# Patient Record
Sex: FEMALE | Race: WHITE | NOT HISPANIC OR LATINO | Employment: FULL TIME | URBAN - METROPOLITAN AREA
[De-identification: names, ages, dates, MRNs, and addresses within clinical notes are randomized per-mention and may not be internally consistent; named-entity substitution may affect disease eponyms.]

---

## 2020-01-28 ENCOUNTER — CLINICAL SUPPORT (OUTPATIENT)
Dept: NUTRITION | Facility: HOSPITAL | Age: 25
End: 2020-01-28
Payer: COMMERCIAL

## 2020-01-28 VITALS — WEIGHT: 116 LBS | HEIGHT: 66 IN | BODY MASS INDEX: 18.64 KG/M2

## 2020-01-28 DIAGNOSIS — Z78.9 VEGAN: ICD-10-CM

## 2020-01-28 NOTE — PROGRESS NOTES
Initial Nutrition Assessment Form    Patient Name: Ramana Springer    YOB: 1995    Sex: Female     Assessment Date: 1/28/2020  Start Time: 9:35 Stop Time: 10:15 Total Minutes: 45min     Data:  Present at session: self   Parent/Patient Concerns: Increased fatigue    Medical Dx/Reason for Referral: Vegan Z78 9   No past medical history on file  No current outpatient medications on file  No current facility-administered medications for this visit  Hemachomatosis- elevated iron levels   Additional Meds/Supplements: Vit B12, Vit D   Special Learning Needs: none   Height: 5'6"   Weight: Wt Readings from Last 10 Encounters:   01/28/20 52 6 kg (116 lb)     Estimated body mass index is 18 72 kg/m² as calculated from the following:    Height as of this encounter: 5' 6" (1 676 m)  Weight as of this encounter: 52 6 kg (116 lb)  Recent Weight Change: []Yes     [x]No  Amount:       Energy Needs: [address] Equation:  1700   Allergies not on file    Social History     Substance and Sexual Activity   Alcohol Use Not on file       Social History     Tobacco Use   Smoking Status Not on file       Who shops? patient   Who cooks? patient   Exercise: Walks her dog, goes on hikes, wants to life weights to gain muscle   Prior Counseling? []Yes     [x]No  When: none     Why: none        Diet Hx:  Breakfast: oatmeal packet, eggs, fruit, acai bowls, chobani yogurt drink, edmund bars 930-10 a m  Lunch: started incorporating turkey sandwiches back into diet  Salads, rice and beans, pasta and veggies, avocado, smoothies 3 p m  Dinner: [employer] products, same items as lunch Amandeep food 7-9 p m  Snacks: grazes throughout the day on Protein bars         Nutrition Diagnosis:   Inadequate energy intake  related to estimated caloric needs > estimated caloric intake as evidenced by  24hr recall, plant-based diet off and on over the past 10 years, chronic fatigue          Medical Nutrition Therapy Intervention:  []Individualized Meal Plan []Understanding Lab Values   []Basic Pathophysiology of Disease []Food/Medication Interactions   []Food Diary [x]Exercise   [x]Lifestyle/Behavior Modification Techniques []Medication, Mechanism of Action   []Label Reading []Self Blood Glucose Monitoring   []Weight/BMI Goals []Other -    Other Notes: Rima Gee presents today to discuss her current eating habits in relation to her fatigue  She has been vegetarian on and off for the past 10 years  Bloodwork was performed by her PCP  She has hemachomatosis, therefore she has high iron levels, the rest of her bloodwork (scanned into media section) shows normal B vitamin levels, as well as normal BUN, Vit D was low, normal thyroid function  After review of 24 hr recall, pt is not eating enough calories throughout the day as she eats small portions, and she has long gaps in between her meals, around 6h, which may be contributing to her fatigue  Also focused on protein, Viut B12, Vit D, calcium and zinc for optimal plant-based health  Also discussed her intake of Morning Star products and recommended switching to other whole-food tofu/soy products for plant based protein  Handouts provided on Vit D, calcium, protein, zinc       Comprehension: []Excellent  []Very Good  [x]Good  []Fair   []Poor    Receptivity: []Excellent  []Very Good  [x]Good  []Fair   []Poor    Expected Compliance: []Excellent  []Very Good  [x]Good  []Fair   []Poor        Goals:  1  Increase calories throughout the day to prevent feelings of fatigue   2  Adjust timing of meals to reduce long time periods between meals   3  Increase whole food plant-based protein items        No follow-ups on file    Labs:  CMP  No results found for: NA, K, CL, CO2, ANIONGAP, BUN, CREATININE, GLUCOSE, GLUF, CALCIUM, CORRECTEDCA, AST, ALT, ALKPHOS, PROT, BILITOT, EGFR    BMP  No results found for: GLUCOSE, CALCIUM, NA, K, CO2, CL, BUN, CREATININE    Lipids  No results found for: CHOL  No results found for: HDL  No results found for: LDLCALC  No results found for: TRIG  No results found for: CHOLHDL    Hemoglobin A1C  No results found for: HGBA1C    Fasting Glucose  No results found for: GLUF    Insulin     Thyroid  No results found for: TSH, R2BZYZZ, N4PNWUE, THYROIDAB    Hepatic Function Panel  No results found for: ALT, AST, GGT, ALKPHOS, BILITOT    Celiac Disease Antibody Panel  No results found for: ENDOMYSIAL IGA, GLIADIN IGA, GLIADIN IGG, IGA, TISSUE TRANSGLUT AB, TTG IGA   Iron  No results found for: IRON, TIBC, FERRITIN    Vitamins  No results found for: VITAMIN B2   No results found for: NICOTINAMIDE, NICOTINIC ACID   No results found for: VITAMINB6  No results found for: XXHODCFY51  No results found for: VITB5  No results found for: R2DDMVSX  No results found for: THYROGLB  No results found for: VITAMIN K   No results found for: 25-HYDROXY VIT D   No components found for: Zuleyma Oconnor MS, RD, LDN  [phone]  [email or web address]  Sr[address] Mun[address]  [address] [address]

## 2020-01-28 NOTE — PROGRESS NOTES
Pt visited for nutrition education on plant-based diet  Pt's insurance will not cover nutrition appointment  Out of pocket charge of $80 to be billed to patient  Kvng Willoughby MS, RD, [address] [address]  [email or web address]

## 2021-08-10 ENCOUNTER — HOSPITAL ENCOUNTER (OUTPATIENT)
Dept: ULTRASOUND IMAGING | Facility: CLINIC | Age: 26
Discharge: HOME | End: 2021-08-10
Payer: COMMERCIAL

## 2021-08-10 DIAGNOSIS — R10.11 RIGHT UPPER QUADRANT PAIN: ICD-10-CM

## 2021-08-10 PROCEDURE — 76705 ECHO EXAM OF ABDOMEN: CPT

## 2021-12-09 ENCOUNTER — NURSE TRIAGE (OUTPATIENT)
Dept: OTHER | Facility: OTHER | Age: 26
End: 2021-12-09

## 2021-12-09 DIAGNOSIS — Z11.59 SPECIAL SCREENING EXAMINATION FOR VIRAL DISEASE: Primary | ICD-10-CM

## 2021-12-09 PROCEDURE — U0005 INFEC AGEN DETEC AMPLI PROBE: HCPCS | Performed by: FAMILY MEDICINE

## 2021-12-09 PROCEDURE — U0003 INFECTIOUS AGENT DETECTION BY NUCLEIC ACID (DNA OR RNA); SEVERE ACUTE RESPIRATORY SYNDROME CORONAVIRUS 2 (SARS-COV-2) (CORONAVIRUS DISEASE [COVID-19]), AMPLIFIED PROBE TECHNIQUE, MAKING USE OF HIGH THROUGHPUT TECHNOLOGIES AS DESCRIBED BY CMS-2020-01-R: HCPCS | Performed by: FAMILY MEDICINE

## 2022-03-17 ENCOUNTER — OFFICE VISIT (OUTPATIENT)
Dept: OBGYN CLINIC | Facility: CLINIC | Age: 27
End: 2022-03-17
Payer: COMMERCIAL

## 2022-03-17 VITALS
WEIGHT: 129 LBS | SYSTOLIC BLOOD PRESSURE: 116 MMHG | HEIGHT: 65 IN | DIASTOLIC BLOOD PRESSURE: 78 MMHG | BODY MASS INDEX: 21.49 KG/M2

## 2022-03-17 DIAGNOSIS — N94.3 PMS (PREMENSTRUAL SYNDROME): ICD-10-CM

## 2022-03-17 DIAGNOSIS — Z01.419 ENCOUNTER FOR GYNECOLOGICAL EXAMINATION (GENERAL) (ROUTINE) WITHOUT ABNORMAL FINDINGS: Primary | ICD-10-CM

## 2022-03-17 PROCEDURE — G0145 SCR C/V CYTO,THINLAYER,RESCR: HCPCS | Performed by: OBSTETRICS & GYNECOLOGY

## 2022-03-17 PROCEDURE — S0610 ANNUAL GYNECOLOGICAL EXAMINA: HCPCS | Performed by: OBSTETRICS & GYNECOLOGY

## 2022-03-17 RX ORDER — UBIDECARENONE 75 MG
1 CAPSULE ORAL EVERY MORNING
COMMUNITY

## 2022-03-17 RX ORDER — FLUOXETINE 10 MG/1
10 CAPSULE ORAL DAILY
Qty: 30 CAPSULE | Refills: 2 | Status: SHIPPED | OUTPATIENT
Start: 2022-03-17 | End: 2022-06-21

## 2022-03-17 RX ORDER — PNV NO.95/FERROUS FUM/FOLIC AC 28MG-0.8MG
2 TABLET ORAL
COMMUNITY

## 2022-03-17 NOTE — PROGRESS NOTES
Stan Maryan  1995      CC:  Yearly exam - New Patient Annual    S:  27 y o  female here for yearly exam      Cycles reviewed:  11/11, 12/9, 1/3, 1/28, 2/26  They are not heavy other than first day  Mild cramping  Does have PMS symptoms (mood changes pre-cycle)  Did have breast lumpectomy - no concerns since then  She denies concerning vaginal discharge, itching, pelvic pain  She has does not urinary concerns, does not have incontinence  No bowel concerns  No breast concerns  Sexual activity: She is sexually active without pain, bleeding or dryness  She is  and monogamous  She is not interested in STD screening today  Works as therapist - mostly teens/adolescents/eating disorders in [facility]  Contraception: She is not using contraception  Open to pregnancy if occurs  Last Pap: 2020?  (2017- negative)    We reviewed [facility] guidelines for Pap testing today  Family hx of breast cancer: no  Family hx of ovarian cancer: no  Family hx of colon cancer: no      Current Outpatient Medications:     cholecalciferol (VITAMIN D3) 250 MCG (24142 UT) capsule, Take 1 capsule by mouth every morning, Disp: , Rfl:     cyanocobalamin (VITAMIN B-12) 100 mcg tablet, Take 1 tablet by mouth every morning, Disp: , Rfl:     Omega-3 Fatty Acids (Fish Oil Omega-3) 1000 MG CAPS, Take 2 g by mouth, Disp: , Rfl:     There is no problem list on file for this patient  Past Medical History:   Diagnosis Date    Fibroid     Migraine      History reviewed  No pertinent family history  Review of Systems   Respiratory: Negative  Cardiovascular: Negative  Gastrointestinal: Negative for constipation and diarrhea  O:  Blood pressure 116/78, height 5' 5" (1 651 m), weight 58 5 kg (129 lb)  Patient appears well and is not in distress  Breasts are symmetrical without mass, tenderness, nipple discharge, skin changes or adenopathy  Abdomen is soft and nontender without masses  External genitals are normal without lesions or rashes  Urethral meatus and urethra are normal  Bladder is normal to palpation  Vagina is normal without discharge or bleeding  Cervix is normal without discharge or lesion  Uterus is normal, mobile, nontender without palpable mass  Adnexa are normal, nontender, without palpable mass  A:  Yearly exam, PMS symptoms  P:   Pap with reflex today   Discussed PMS symptoms, discussed OCP v  Luteal phase prozac - she has interest in trying this  Rx to pharmacy, follow up in 3 months  Continue to track cycles  Add folic acid or PNV to vitamins since not preventing pregnancy  RTO one year for yearly exam or sooner as needed

## 2022-03-22 LAB
LAB AP GYN PRIMARY INTERPRETATION: NORMAL
Lab: NORMAL

## 2022-06-21 ENCOUNTER — TELEMEDICINE (OUTPATIENT)
Dept: OBGYN CLINIC | Facility: CLINIC | Age: 27
End: 2022-06-21
Payer: COMMERCIAL

## 2022-06-21 DIAGNOSIS — N94.3 PMS (PREMENSTRUAL SYNDROME): Primary | ICD-10-CM

## 2022-06-21 PROCEDURE — 99212 OFFICE O/P EST SF 10 MIN: CPT | Performed by: OBSTETRICS & GYNECOLOGY

## 2022-06-21 NOTE — PROGRESS NOTES
Virtual Regular Visit    Verification of patient location:    Patient is located in the following state in which I hold an active license [place]      Assessment/Plan:    Problem List Items Addressed This Visit        Other    PMS (premenstrual syndrome) - Primary    Relevant Orders    TSH, 3rd generation with Free T4 reflex        We discussed the other options for treatment - we discussed that Lexapro (7-14% s/e of insomnia) has lowed risk of this compared to other options  Can consider luteal phase dosing  She will let us know if she desires to try a different medication  Discussed hormone testing - I discussed that I would advise thyroid testing and this order was placed  Reason for visit is   Chief Complaint   Patient presents with    Gynecology Problem     Took Prozac - x3 days only- could not sleep at all   Virtual Regular Visit        Encounter provider Katelyn Bradford MD    Provider located at [address] [address]  [phone]      Recent Visits  No visits were found meeting these conditions  Showing recent visits within past 7 days and meeting all other requirements  Today's Visits  Date Type Provider Dept   [facility]21/22 [facility]  today's visits and meeting all other requirements  Future Appointments  No visits were found meeting these conditions  Showing future appointments within next 150 days and meeting all other requirements       The patient was identified by name and date of birth  Katia Flor was informed that this is a telemedicine visit and that the visit is being conducted through [employer] and patient was informed that this is a secure, HIPAA-compliant platform  She agrees to proceed     My office door was closed  No one else was in the room    She acknowledged consent and understanding of privacy and security of the video platform  The patient has agreed to participate and understands they can discontinue the visit at any time  Patient is aware this is a billable service  Shannon Cabezas presents as a virtual visit to discuss her use of Prozac  This was prescribed for luteal phase dosing (second half of menstrual cycle) to see if this helped her mood symptoms  She took this for 3 days and found it caused extreme insomnia  She then stopped it and her sleep eventually returned to normal      She wonders if she could get hormone testing  She is unsure if she wants to start a different medication or just wait and see  Past Medical History:   Diagnosis Date    Fibroid     Migraine        Past Surgical History:   Procedure Laterality Date    BREAST BIOPSY      MYOMECTOMY         Current Outpatient Medications   Medication Sig Dispense Refill    cholecalciferol (VITAMIN D3) 250 MCG (85442 UT) capsule Take 1 capsule by mouth every morning      cyanocobalamin (VITAMIN B-12) 100 mcg tablet Take 1 tablet by mouth every morning      Omega-3 Fatty Acids (Fish Oil Omega-3) 1000 MG CAPS Take 2 g by mouth       No current facility-administered medications for this visit  Allergies   Allergen Reactions    Penicillins Hives, Shortness Of Breath and Swelling       Review of Systems    Video Exam    There were no vitals filed for this visit  Physical Exam  Vitals reviewed  Constitutional:       Appearance: Normal appearance  Neurological:      Mental Status: She is alert  I spent 10 minutes directly with the patient during this visit    [address] [facility] verbally agrees to participate in [facility]   Pt is aware that [facility] could be limited without vital signs or the ability to perform a full hands-on physical Erin Fabian understands she or the provider may request at any time to terminate the video visit and request the patient to seek care or treatment in person

## 2022-10-14 ENCOUNTER — OFFICE VISIT (OUTPATIENT)
Dept: FAMILY MEDICINE CLINIC | Facility: CLINIC | Age: 27
End: 2022-10-14
Payer: COMMERCIAL

## 2022-10-14 ENCOUNTER — TELEPHONE (OUTPATIENT)
Dept: FAMILY MEDICINE CLINIC | Facility: CLINIC | Age: 27
End: 2022-10-14

## 2022-10-14 VITALS
OXYGEN SATURATION: 95 % | BODY MASS INDEX: 20.31 KG/M2 | SYSTOLIC BLOOD PRESSURE: 108 MMHG | HEIGHT: 67 IN | TEMPERATURE: 97.9 F | WEIGHT: 129.4 LBS | DIASTOLIC BLOOD PRESSURE: 70 MMHG | HEART RATE: 85 BPM

## 2022-10-14 DIAGNOSIS — R19.7 DIARRHEA, UNSPECIFIED TYPE: ICD-10-CM

## 2022-10-14 DIAGNOSIS — Z11.59 NEED FOR HEPATITIS C SCREENING TEST: Primary | ICD-10-CM

## 2022-10-14 DIAGNOSIS — R10.13 DYSPEPSIA: ICD-10-CM

## 2022-10-14 DIAGNOSIS — R10.11 RUQ PAIN: ICD-10-CM

## 2022-10-14 PROCEDURE — 99213 OFFICE O/P EST LOW 20 MIN: CPT

## 2022-10-14 NOTE — PROGRESS NOTES
Family Medicine Office Visit  Reynold Jean 32 y o  female   MRN: [id number] : 1995  ENCOUNTER: 10/14/2022 3:19 PM    Assessment & Plan     This is a 32 y o  female who is here for Abdominal Pain    Michell Nayla was seen today for abdominal pain  Diagnoses and all orders for this visit:    Need for hepatitis C screening test  -     Hepatitis C Antibody ([facility]); Future    RUQ pain  -     US right upper quadrant; Future  -     Lipase; Future  -     Hepatic function panel; Future  -     Celiac Disease Antibody Profile; Future    Dyspepsia  -     US right upper quadrant; Future  -     Lipase; Future  -     Hepatic function panel; Future  -     Celiac Disease Antibody Profile; Future    Diarrhea, unspecified type  -     Celiac Disease Antibody Profile; Future        Follow-up in 4 weeks for annual physical, abdominal pain    Subjective     CC: Abdominal Pain       HPI  Reynold Jean is a 32y o -year-old female who  has a past medical history of Fibroid and Migraine        Today she presents for lower R rib / RUQ pain  First felt a year ago when her  went to [facility] her  Felt like a sharp pain  Comes and goes spontaneously since then  No consistent association with eating or drinking  She last felt it last night into this morning  Intensity 5/10, worse with movement and palpation  Feels more like discomfort, bloating and pressure rather than true pain  She tried a heating pack and a dose of Tylenol last night which helped w/symptoms  Pt also endorses diarrhea several times per week for a long time, over a year  She suspects she has lactose intolerance  She decreased her lactose consumption which caused minimal improvement  Review of Systems   Constitutional: Negative for chills and fever  HENT: Negative for ear pain and sore throat  Eyes: Negative for pain and visual disturbance  Respiratory: Negative for cough and shortness of breath      Cardiovascular: Negative for chest pain and palpitations  Gastrointestinal: Positive for abdominal pain and diarrhea  Negative for blood in stool and vomiting  Genitourinary: Negative for dysuria and hematuria  Musculoskeletal: Negative for arthralgias and back pain  Skin: Negative for color change and rash  Neurological: Negative for seizures and syncope  All other systems reviewed and are negative  Past Medical History, Past Surgical History, Family History, and Social History were reviewed and updated today as appropriate  Objective   /70   Pulse 85   Temp 97 9 °F (36 6 °C)   Ht 5' 6 5" (1 689 m)   Wt 58 7 kg (129 lb 6 4 oz)   SpO2 95%   BMI 20 57 kg/m²     Physical Exam  Constitutional:       General: She is not in acute distress  Appearance: Normal appearance  She is not ill-appearing  HENT:      Head: Normocephalic and atraumatic  Right Ear: Tympanic membrane and external ear normal       Left Ear: Tympanic membrane and external ear normal       Mouth/Throat:      Mouth: Mucous membranes are moist       Pharynx: Oropharynx is clear  No oropharyngeal exudate  Eyes:      Extraocular Movements: Extraocular movements intact  Pupils: Pupils are equal, round, and reactive to light  Cardiovascular:      Rate and Rhythm: Normal rate and regular rhythm  Pulses: Normal pulses  Heart sounds: Normal heart sounds  Pulmonary:      Effort: Pulmonary effort is normal       Breath sounds: Normal breath sounds  No wheezing, rhonchi or rales  Abdominal:      General: Abdomen is flat  Bowel sounds are normal  There is no distension  Palpations: Abdomen is soft  Tenderness: There is no abdominal tenderness  Skin:     General: Skin is warm and dry  Capillary Refill: Capillary refill takes less than 2 seconds  Neurological:      General: No focal deficit present  Mental Status: She is alert and oriented to person, place, and time     Psychiatric:         Mood and Affect: Mood normal  Behavior: Behavior normal           Allergies   Allergen Reactions   • Penicillins Hives, Shortness Of Breath and Swelling       Health Maintenance     Health Maintenance   Topic Date Due   • HIV Screening  Never done   • Annual Physical  Never done   • DTaP,Tdap,and Td Vaccines (1 - Tdap) Never done   • COVID-19 Vaccine (3 - Booster for Pfizer series) 01/08/2022   • Influenza Vaccine (1) Never done   • Depression Screening  10/14/2023   • BMI: Adult  10/14/2023   • Cervical Cancer Screening  03/17/2025   • Hepatitis C Screening  Completed   • Pneumococcal Vaccine: Pediatrics (0 to 5 Years) and At-Risk Patients (6 to 59 Years)  Aged Out   • HIB Vaccine  Aged Out   • Hepatitis B Vaccine  Aged Out   • IPV Vaccine  Aged Out   • Hepatitis A Vaccine  Aged Out   • Meningococcal ACWY Vaccine  Aged Out   • HPV Vaccine  Aged [employer] History   Administered Date(s) Administered   • COVID-19 PFIZER VACCINE 0 3 ML IM 07/18/2021, 08/08/2021         [address] [place] [address], [facility]   10/14/2022  3:19 PM    Parts of this note were dictated using [employer] dictation software

## 2022-10-14 NOTE — PATIENT INSTRUCTIONS
Low FODMAPs Diet     You need a diet that limits, but does not eliminate foods that contain: lactose, fructose, fructans, galactans, and sugar alcohols (polyols)  This is often referred to as a low FODMAPs diet as it is low in fermentable oligo-, di-, and monosaccharides and polyls (FODMAPs)  The low FODMAPs diet will help minimize symptoms, such as bloating, cramping, burping, flatulence, and constipation and/or diarrhea, that are often associated with Irritable Bowel Syndrome (IBS)  Because there are different levels of intolerance, you need to eliminate foods high in FODMAPs for 6-8 weeks and then gradually reintroduce foods to identify bothersome foods  Reintroduce one food every four days with a 2-week break between bothersome foods  The end result is to identify the threshold that you are able to consume FODMAP containing foods without causing bothersome GI symptoms       Type of Food High in FODMAPs Low in FODMAPs   Milk -Milk: Cow, Sheep, Goat, Soy  -Creamy soups made with    milk  -Evaporated milk  -Sweetened condensed milk -Milk: [place]opus, Coconut, Hazelnut, Hemp, Rice  -Lactose free cow's milk  -Lactose free prashanth  -Lactose free ice cream    (non-dairy alternatives)  -Purchase lactase enzyme to  make your own evaporated or  condensed milk if needed   Yogurt -Cow's milk yogurt (Greek yogurt is lowest in FODMAPs)  -Soy yogurt -Coconut milk yogurt   Cheese -Cottage cheese  -Ricotta cheese  -Marscapone cheese -Hard cheeses including  cheddar, Swiss, brie, blue  cheese, mozzarella, parmesan,  and feta  -No more than 2 tablespoons ricotta or cottage cheese  -Lactose free cottage cheese   Dairy-based  Condiments -Sour cream  -Whipping cream -Butter  -Half and half  -Cream cheese   Dairy-based desserts -Ice cream  -Frozen yogurt  -Sherbet -Sorbet from FODMAPs friendly fruit   Fruit -Apples, Pears  -Cherries, Raspberries,  Blackberries  -Watermelon  -Nectarines, White peaches, Apricots, Plums  -Peaches  -Prunes  -Matt, Papaya  -Persimmon  -Orange juice  -Canned fruit  -Large portions of any fruit -Banana  -Blueberries, Strawberries  -Cantaloupe, Honeydew  -Grapefruit, Lemon, Lime  -Grapes  -Kiwi  -Pineapple  -Rhubarb  -Tangelos  - <1/4 avocado  - <1 tablespoon dried fruit     Limit consumption to one low FODMAPs fruit per meal   Consume ripe fruit   (Firm, less- ripe fruit contains more fructose )   Vegetables -Artichokes  -Asparagus  -Sugar snap peas  -Cabbage  -Onions  -Shallot  -Rodrick  -Onion and garlic salt  powders  -Garlic  -Cauliflower  -Mushrooms  -Pumpkin  -Green Peppers -Bok rafiq, Bean sprouts  -Red bell pepper  -Lettuce, Spinach  -Carrots  -Chives, Spring onion (green part    Only)  -Cucumber  -Eggplant  -Green beans  -Tomato  -Potatoes  -Garlic infused oil; purchase    flavored oil or saute onion and    garlic in oil and then discard    onion and garlic  -Water chestnuts  -<1 stick celery  -<1/2 cup sweet potato, broccoli,    Dunn Loring sprouts, butternut    squash, fennel  -<1/3 cup green peas  -<10 snow peas         Grains -Wheat  -Rye  -Barley-large quantities  -Spelt -Brown rice  -Oats, oat bran  -Quinoa  -Corn  -Gluten-free bread, cereals,    pastas and crackers without    honey, apple/pear juice, agave    or HFCS  -Namaste Food Perfect Flour  Blend or Ralf Jean [place] Gluten Free  -Multi-Purpose Flour   Legumes -Chickpeas, Hummus  -Kidney beans, Baked beans  -Edamame  -Lentils  -Soy milk -Tofu  -Peanuts   Nuts and Seeds -Pistachios -10-15 max or 1-2 tablespoons of Almonds, Macadamia, Pecans,  Pine nuts, Walnuts, Pumpkin  Seed, Sesame seeds, and  Sunflower seeds   Sweeteners -Honey  -Agave  -High fructose corn syrup  -Sorbitol, Mannitol, Xylitol,  Maltitol  -Splenda (may alter friendly  gut lelo) -Sugar  -Glucose, Sucrose  -Pure maple syrup  -Aspartame   Additives -Inulin, found in yogurt, prashanth, cereals, and other foods with added fiber  -FOS    (fructo- oligosaccharides)  -Sugar alcohols (see sweeteners)  -Chicory root    Alcohol -Rum -Wine, Beer  -Vodka, Gin  -Limit to one serving as all  alcohol is a gastric irritant   Protein-rich food  -Fish, Chicken, [place] Eggs,  Meat   Fat-rich food  -Olive and canola oil  -Olives  -<1/4 avocado      Sample Low FODMAPs Diet Menu:     Breakfast  [place] Corn Flakes or oats, with rice or almond milk, banana and 1 tablespoon sliced almonds  Sierra[employer] or [employer] oatmeal with 1 tablespoon dried fruit and nuts  Quinoa flakes with rice or almond milk, 3/4 cup strawberries, and 1 tablespoon pecans     Lunch  Fidencio's white bread sandwich with sliced turkey, lettuce or spinach leaves, tomato, sliced cheddar cheese and [place] lactose-free vanilla yogurt, 1/2 cup blueberries and baby carrots  Stir shepherd with brown rice or rice noodles, chicken, shrimp, or beef, peppers and [employer], ask for no onion or garlic and the sauce on the side  Fruit salad with 1 cup (total) low FODMAP fruits, kiwi, strawberries and blueberries, spinach salad with lemon dressing and cherry tomatoes, and brown rice cakes with natural almond butter     Snack  Glutino pretzels or Blue [place] Nut thins and mozzarella string cheese  Hard boiled egg and cherry tomatoes  Pumpkin seeds  Brown rice cakes with natural peanut butter  Banana and handful almonds  1 stick celery with natural almond butter or,  Carrots and red pepper dipped in [employer] chicken or salmon with baked sweet potato with olive oil or butter, sauteed spinach and red peppers seasoned with green parts of onion, salt, pepper, handful of pine nuts and olive oil, and a kiwi  Tracy's baked potato and a side salad with chicken, bring your own homemade salad dressing that does not contain garlic or onion  Sushi     Celiac Disease   WHAT YOU NEED TO KNOW:   Celiac disease is a long-term condition that affects your small intestine   Your immune system reacts to the protein gluten in food and damages your small intestine  You may not be able to absorb vitamins, minerals, and other nutrients from the foods you eat  DISCHARGE INSTRUCTIONS:   Medicines:   Medicines  such as steroids are used to decrease inflammation and suppress your immune system  Take your medicine as directed  Contact your healthcare provider if you think your medicine is not helping or if you have side effects  Tell him if you are allergic to any medicine  Keep a list of the medicines, vitamins, and herbs you take  Include the amounts, and when and why you take them  Bring the list or the pill bottles to follow-up visits  Carry your medicine list with you in case of an emergency  Follow up with your healthcare provider as directed: You may need to have blood tests or another endoscopy  Write down your questions so you remember to ask them during your visits  Manage your celiac disease:   Do not eat food that contains gluten  This is the most important way to manage your symptoms  Do not eat anything made with wheat, rye, barley, or oats  Gluten is found in additives in many packaged and restaurant foods  Read food labels or ask before you order food  You may need to avoid dairy products for a period of time  A dietitian may help you plan meals that do not contain gluten  Ask about supplements  You may need to take supplements that contain iron, folic acid, vitamin P39, calcium, or vitamin D  Contact your healthcare provider if:   You have new symptoms or your symptoms get worse  You have questions or concerns about your condition or care  Return to the emergency department if:   You have a fever  You have severe abdominal pain  You have blood in your bowel movement  © Copyright [employer] 2022 Information is for End User's use only and may not be sold, redistributed or otherwise used for commercial purposes   All illustrations and images included in CareNotes® are the copyrighted property of [employer]  or [address]   The above information is an  only  It is not intended as medical advice for individual conditions or treatments  Talk to your doctor, nurse or pharmacist before following any medical regimen to see if it is safe and effective for you  Biliary Colic   WHAT YOU NEED TO KNOW:   Biliary colic is severe pain in your upper abdomen caused by a gallbladder problem  Your gallbladder stores bile, which helps break down the fats that you eat  DISCHARGE INSTRUCTIONS:   Medicines:   Medicines  can help decrease pain and muscle spasms  You may also need medicine to calm your stomach and stop vomiting  Take your medicine as directed  Contact your healthcare provider if you think your medicine is not helping or you have side effects  Tell him if you are allergic to any medicine  Keep a list of the medicines, vitamins, and herbs you take  Include the amounts, and when and why you take them  Bring the list or the pill bottles to follow-up visits  Carry your medicine list with you in case of an emergency  Avoid alcohol:  Alcohol can damage your gallbladder and make your symptoms worse  Maintain a healthy weight:  Ask your healthcare provider how much you should weigh  Ask him to help you create a weight loss plan if you are overweight  Eat a variety of healthy foods:  Healthy foods include fruits, vegetables, whole-grain breads, low-fat dairy products, beans, lean meats, and fish  Foods that are high in fiber and low in fat and cholesterol may decrease your symptoms  Ask if you need to be on a special diet  Exercise:  Ask your healthcare provider about the best exercise plan for you  Exercise may help improve your symptoms  Follow up with your healthcare provider as directed:  Bring a list of any questions you have so you remember to ask them during your visits  Contact your healthcare provider if:   You have a fever      Your pain gets worse, even after you take medicine  You have nausea or are vomiting  Your skin or eyes are yellow  You have questions or concerns about your condition or care  Return to the emergency department if:   You have severe pain  You feel like you are going to faint  You are short of breath  © Copyright [employer] 2022 Information is for End User's use only and may not be sold, redistributed or otherwise used for commercial purposes  All illustrations and images included in CareNotes® are the copyrighted property of A D A M , Inc  or Destini Domínguez  The above information is an  only  It is not intended as medical advice for individual conditions or treatments  Talk to your doctor, nurse or pharmacist before following any medical regimen to see if it is safe and effective for you  Costochondritis   WHAT YOU NEED TO KNOW:   Costochondritis is a condition that causes pain in the cartilage that connect your ribs to your sternum (breastbone)  Cartilage is the tough, bendable tissue that protects your bones  DISCHARGE INSTRUCTIONS:   Medicines:   Acetaminophen: This medicine decreases pain  Acetaminophen is available without a doctor's order  Ask how much to take and how often to take it  Follow directions  Acetaminophen can cause liver damage if not taken correctly  NSAIDs , such as ibuprofen, help decrease swelling, pain, and fever  This medicine is available with or without a doctor's order  NSAIDs can cause stomach bleeding or kidney problems in certain people  If you take blood thinner medicine, always ask if NSAIDs are safe for you  Always read the medicine label and follow directions  Do not give these medicines to children under 10months of age without direction from your child's healthcare provider  Take your medicine as directed  Contact your healthcare provider if you think your medicine is not helping or if you have side effects  Tell him of her if you are allergic to any medicine  Keep a list of the medicines, vitamins, and herbs you take  Include the amounts, and when and why you take them  Bring the list or the pill bottles to follow-up visits  Carry your medicine list with you in case of an emergency  Follow up with your doctor as directed:  Write down your questions so you remember to ask them during your visits  Rest:  You may need to get more rest  Learn which movements and activities cause pain, and avoid doing them  Do not carry objects, such as a purse or backpack, if this is painful  Avoid activities such as rowing and weightlifting until your pain decreases or goes away  Ask which activities are best for you to do while you recover  Heat:  Heat helps decrease pain in some patients  Apply heat on the area for 20 to 30 minutes every 2 hours for as many days as directed  Ice:  Ice helps decrease swelling and pain  Ice may also help prevent tissue damage  Use an ice pack, or put crushed ice in a plastic bag  Cover it with a towel and place it on the painful area for 15 to 20 minutes every hour or as directed  Stretching exercises:  Gentle stretching may help your symptoms   a doorway and put your hands on the door frame at the level of your ears or shoulders  Take 1 step forward and gently stretch your chest  Try this with your hands higher up on the doorway  Contact your healthcare provider if:   You have a fever  The painful areas of your chest look swollen, red, and feel warm to the touch  You cannot sleep because of the pain  You have questions or concerns about your condition or care  © Copyright [employer] 2022 Information is for End User's use only and may not be sold, redistributed or otherwise used for commercial purposes  All illustrations and images included in CareNotes® are the copyrighted property of A D A [employer]  or Destini Domínguez  The above information is an  only   It is not intended as medical advice for individual conditions or treatments  Talk to your doctor, nurse or pharmacist before following any medical regimen to see if it is safe and effective for you

## 2022-10-15 ENCOUNTER — APPOINTMENT (OUTPATIENT)
Dept: LAB | Facility: CLINIC | Age: 27
End: 2022-10-15
Payer: COMMERCIAL

## 2022-10-15 DIAGNOSIS — R10.13 DYSPEPSIA: ICD-10-CM

## 2022-10-15 DIAGNOSIS — Z11.59 NEED FOR HEPATITIS C SCREENING TEST: ICD-10-CM

## 2022-10-15 DIAGNOSIS — R10.11 RUQ PAIN: ICD-10-CM

## 2022-10-15 DIAGNOSIS — N94.3 PMS (PREMENSTRUAL SYNDROME): ICD-10-CM

## 2022-10-15 DIAGNOSIS — R19.7 DIARRHEA, UNSPECIFIED TYPE: ICD-10-CM

## 2022-10-15 LAB
ALBUMIN SERPL BCP-MCNC: 4 G/DL (ref 3.5–5)
ALP SERPL-CCNC: 56 U/L (ref 46–116)
ALT SERPL W P-5'-P-CCNC: 19 U/L (ref 12–78)
AST SERPL W P-5'-P-CCNC: 9 U/L (ref 5–45)
BILIRUB DIRECT SERPL-MCNC: 0.21 MG/DL (ref 0–0.2)
BILIRUB SERPL-MCNC: 0.94 MG/DL (ref 0.2–1)
HCV AB SER QL: NORMAL
LIPASE SERPL-CCNC: 149 U/L (ref 73–393)
PROT SERPL-MCNC: 7 G/DL (ref 6.4–8.4)
TSH SERPL DL<=0.05 MIU/L-ACNC: 1.43 UIU/ML (ref 0.45–4.5)

## 2022-10-15 PROCEDURE — 86803 HEPATITIS C AB TEST: CPT

## 2022-10-15 PROCEDURE — 83690 ASSAY OF LIPASE: CPT

## 2022-10-15 PROCEDURE — 80076 HEPATIC FUNCTION PANEL: CPT

## 2022-10-15 PROCEDURE — 84443 ASSAY THYROID STIM HORMONE: CPT

## 2022-10-15 PROCEDURE — 86258 DGP ANTIBODY EACH IG CLASS: CPT

## 2022-10-15 PROCEDURE — 86231 EMA EACH IG CLASS: CPT

## 2022-10-15 PROCEDURE — 86364 TISS TRNSGLTMNASE EA IG CLAS: CPT

## 2022-10-15 PROCEDURE — 36415 COLL VENOUS BLD VENIPUNCTURE: CPT

## 2022-10-15 PROCEDURE — 82784 ASSAY IGA/IGD/IGG/IGM EACH: CPT

## 2022-10-17 PROBLEM — R10.13 DYSPEPSIA: Status: ACTIVE | Noted: 2022-10-17

## 2022-10-17 PROBLEM — R10.11 RUQ PAIN: Status: ACTIVE | Noted: 2022-10-17

## 2022-10-17 PROBLEM — R19.7 DIARRHEA: Status: ACTIVE | Noted: 2022-10-17

## 2022-10-18 ENCOUNTER — HOSPITAL ENCOUNTER (OUTPATIENT)
Dept: ULTRASOUND IMAGING | Facility: HOSPITAL | Age: 27
Discharge: HOME | End: 2022-10-18
Payer: COMMERCIAL

## 2022-10-18 DIAGNOSIS — R10.11 RUQ PAIN: ICD-10-CM

## 2022-10-18 DIAGNOSIS — R10.13 DYSPEPSIA: ICD-10-CM

## 2022-10-18 PROCEDURE — 76705 ECHO EXAM OF ABDOMEN: CPT

## 2022-10-19 LAB
ENDOMYSIUM IGA SER QL: NEGATIVE
GLIADIN PEPTIDE IGA SER-ACNC: 2 UNITS (ref 0–19)
GLIADIN PEPTIDE IGG SER-ACNC: 2 UNITS (ref 0–19)
IGA SERPL-MCNC: 84 MG/DL (ref 87–352)
TTG IGA SER-ACNC: <2 U/ML (ref 0–3)
TTG IGG SER-ACNC: <2 U/ML (ref 0–5)

## 2022-11-02 ENCOUNTER — OFFICE VISIT (OUTPATIENT)
Dept: URGENT CARE | Facility: CLINIC | Age: 27
End: 2022-11-02

## 2022-11-02 VITALS
TEMPERATURE: 98.5 F | DIASTOLIC BLOOD PRESSURE: 68 MMHG | SYSTOLIC BLOOD PRESSURE: 113 MMHG | HEART RATE: 111 BPM | RESPIRATION RATE: 16 BRPM

## 2022-11-02 DIAGNOSIS — R35.0 FREQUENCY OF MICTURITION: ICD-10-CM

## 2022-11-02 DIAGNOSIS — N30.01 ACUTE CYSTITIS WITH HEMATURIA: Primary | ICD-10-CM

## 2022-11-02 LAB
SL AMB  POCT GLUCOSE, UA: NEGATIVE
SL AMB LEUKOCYTE ESTERASE,UA: ABNORMAL
SL AMB POCT BILIRUBIN,UA: NEGATIVE
SL AMB POCT BLOOD,UA: ABNORMAL
SL AMB POCT CLARITY,UA: ABNORMAL
SL AMB POCT COLOR,UA: ABNORMAL
SL AMB POCT KETONES,UA: NEGATIVE
SL AMB POCT NITRITE,UA: POSITIVE
SL AMB POCT PH,UA: 7.5
SL AMB POCT SPECIFIC GRAVITY,UA: 1.03
SL AMB POCT URINE PROTEIN: 300
SL AMB POCT UROBILINOGEN: 0.2

## 2022-11-02 RX ORDER — PHENAZOPYRIDINE HYDROCHLORIDE 200 MG/1
200 TABLET, FILM COATED ORAL
Qty: 6 TABLET | Refills: 0 | Status: SHIPPED | OUTPATIENT
Start: 2022-11-02 | End: 2022-11-07 | Stop reason: ALTCHOICE

## 2022-11-02 RX ORDER — SULFAMETHOXAZOLE AND TRIMETHOPRIM 800; 160 MG/1; MG/1
1 TABLET ORAL 2 TIMES DAILY
Qty: 14 TABLET | Refills: 0 | Status: SHIPPED | OUTPATIENT
Start: 2022-11-02 | End: 2022-11-07 | Stop reason: ALTCHOICE

## 2022-11-02 NOTE — PROGRESS NOTES
[facility]        NAME: Lori Heard is a 32 y o  female  : 1995    MRN: [id number]  DATE: 2022  TIME: 9:19 AM    Assessment and Plan   Acute cystitis with hematuria [N30 01]  1  Acute cystitis with hematuria  sulfamethoxazole-trimethoprim (BACTRIM DS) 800-160 mg per tablet    phenazopyridine (PYRIDIUM) 200 mg tablet   2  Frequency of micturition  POCT urine dip    Urine culture         Patient Instructions       Follow up with PCP in 3-5 days  Proceed to  ER if symptoms worsen  Chief Complaint     Chief Complaint   Patient presents with   • Urinary Tract Infection     Pt reports abdominal pain and frequency of urination since yesterday  Currently taking AZO  History of Present Illness       69-year-old female presents the clinic with dysuria, frequency, urgency and lower abdominal pain that started yesterday  Patient is currently on her period and states that she is unsure of any hematuria due to this  Patient is also currently taking azo and states that it was working well during the day but last night she had increased dysuria  Patient denies any flank pain, back pain, fevers, chills, nausea, vomiting or diarrhea  Review of Systems   Review of Systems   Constitutional: Negative for chills and fever  Respiratory: Negative for shortness of breath  Cardiovascular: Negative for chest pain and palpitations  Gastrointestinal: Positive for abdominal pain  Negative for constipation, diarrhea, nausea and vomiting  Genitourinary: Positive for dysuria, frequency, hematuria (pt is currently on her period) and urgency  Negative for flank pain  Musculoskeletal: Negative for back pain  Neurological: Negative for dizziness, syncope, weakness, light-headedness and headaches  All other systems reviewed and are negative          Current Medications       Current Outpatient Medications:   •  phenazopyridine (PYRIDIUM) 200 mg tablet, Take 1 tablet (200 mg total) by mouth 3 (three) times a day with meals, Disp: 6 tablet, Rfl: 0  •  sulfamethoxazole-trimethoprim (BACTRIM DS) 800-160 mg per tablet, Take 1 tablet by mouth 2 (two) times a day for 7 days smx-tmp DS (BACTRIM) 800-160 mg tabs, Disp: 14 tablet, Rfl: 0  •  cholecalciferol (VITAMIN D3) 250 MCG (52538 UT) capsule, Take 1 capsule by mouth every morning, Disp: , Rfl:   •  cyanocobalamin (VITAMIN B-12) 100 mcg tablet, Take 1 tablet by mouth every morning, Disp: , Rfl:   •  Omega-3 Fatty Acids (Fish Oil Omega-3) 1000 MG CAPS, Take 2 g by mouth, Disp: , Rfl:     Current Allergies     Allergies as of 11/02/2022 - Reviewed 11/02/2022   Allergen Reaction Noted   • Penicillins Hives, Shortness Of Breath, and Swelling 03/05/2018            The following portions of the patient's history were reviewed and updated as appropriate: allergies, current medications, past family history, past medical history, past social history, past surgical history and problem list      Past Medical History:   Diagnosis Date   • Fibroid    • Migraine        Past Surgical History:   Procedure Laterality Date   • BREAST BIOPSY     • MYOMECTOMY         History reviewed  No pertinent family history  Medications have been verified  Objective   /68   Pulse (!) 111   Temp 98 5 °F (36 9 °C)   Resp 16   No LMP recorded  Physical Exam     Physical Exam  Vitals and nursing note reviewed  Constitutional:       General: She is not in acute distress  Appearance: She is well-developed  She is not diaphoretic  Pulmonary:      Effort: Pulmonary effort is normal    Abdominal:      General: Bowel sounds are normal       Palpations: Abdomen is soft  Tenderness: There is abdominal tenderness in the suprapubic area  There is no right CVA tenderness, left CVA tenderness or guarding  Skin:     General: Skin is warm and dry  Neurological:      Mental Status: She is alert and oriented to person, place, and time

## 2022-11-02 NOTE — PATIENT INSTRUCTIONS
Urinary Tract Infection in Women   WHAT YOU NEED TO KNOW:   A urinary tract infection (UTI) is caused by bacteria that get inside your urinary tract  Most bacteria that enter your urinary tract come out when you urinate  If the bacteria stay in your urinary tract, you may get an infection  Your urinary tract includes your kidneys, ureters, bladder, and urethra  Urine is made in your kidneys, and it flows from the ureters to the bladder  Urine leaves the bladder through the urethra  A UTI is more common in your lower urinary tract, which includes your bladder and urethra  DISCHARGE INSTRUCTIONS:   Return to the emergency department if:   You are urinating very little or not at all  You have a high fever with shaking chills  You have side or back pain that gets worse  Call your doctor if:   You have a fever  You do not feel better after 2 days of taking antibiotics  You are vomiting  You have questions or concerns about your condition or care  Medicines:   Antibiotics  help fight a bacterial infection  If you have UTIs often (called recurrent UTIs), you may be given antibiotics to take regularly  You will be given directions for when and how to use antibiotics  The goal is to prevent UTIs but not cause antibiotic resistance by using antibiotics too often  Medicines  may be given to decrease pain and burning when you urinate  They will also help decrease the feeling that you need to urinate often  These medicines will make your urine orange or red  Take your medicine as directed  Contact your healthcare provider if you think your medicine is not helping or if you have side effects  Tell him or her if you are allergic to any medicine  Keep a list of the medicines, vitamins, and herbs you take  Include the amounts, and when and why you take them  Bring the list or the pill bottles to follow-up visits  Carry your medicine list with you in case of an emergency      Follow up with your doctor as directed:  Write down your questions so you remember to ask them during your visits  Prevent another UTI:   Empty your bladder often  Urinate and empty your bladder as soon as you feel the need  Do not hold your urine for long periods of time  Wipe from front to back after you urinate or have a bowel movement  This will help prevent germs from getting into your urinary tract through your urethra  Drink liquids as directed  Ask how much liquid to drink each day and which liquids are best for you  You may need to drink more liquids than usual to help flush out the bacteria  Do not drink alcohol, caffeine, or citrus juices  These can irritate your bladder and increase your symptoms  Your healthcare provider may recommend cranberry juice to help prevent a UTI  Urinate after you have sex  This can help flush out bacteria passed during sex  Do not douche or use feminine deodorants  These can change the chemical balance in your vagina  Change sanitary pads or tampons often  This will help prevent germs from getting into your urinary tract  Talk to your healthcare provider about your birth control method  You may need to change your method if it is increasing your risk for UTIs  Wear cotton underwear and clothes that are loose  Tight pants and nylon underwear can trap moisture and cause bacteria to grow  Vaginal estrogen may be recommended  This medicine helps prevent UTIs in women who have gone through menopause or are in andrew-menopause  Do pelvic muscle exercises often  Pelvic muscle exercises may help you start and stop urinating  Strong pelvic muscles may help you empty your bladder easier  Squeeze these muscles tightly for 5 seconds like you are trying to hold back urine  Then relax for 5 seconds  Gradually work up to squeezing for 10 seconds  Do 3 sets of 15 repetitions a day, or as directed      © Copyright [employer] 2022 Information is for End User's use only and may not be sold, redistributed or otherwise used for commercial purposes  All illustrations and images included in CareNotes® are the copyrighted property of A D A M , Inc  or Destini Domínguez  The above information is an  only  It is not intended as medical advice for individual conditions or treatments  Talk to your doctor, nurse or pharmacist before following any medical regimen to see if it is safe and effective for you

## 2022-11-04 LAB — BACTERIA UR CULT: ABNORMAL

## 2022-11-05 ENCOUNTER — TELEPHONE (OUTPATIENT)
Dept: URGENT CARE | Facility: CLINIC | Age: 27
End: 2022-11-05

## 2022-11-05 DIAGNOSIS — N30.01 ACUTE CYSTITIS WITH HEMATURIA: Primary | ICD-10-CM

## 2022-11-05 RX ORDER — CIPROFLOXACIN 500 MG/1
500 TABLET, FILM COATED ORAL EVERY 12 HOURS SCHEDULED
Qty: 14 TABLET | Refills: 0 | Status: SHIPPED | OUTPATIENT
Start: 2022-11-05 | End: 2022-11-12

## 2022-11-07 ENCOUNTER — OFFICE VISIT (OUTPATIENT)
Dept: FAMILY MEDICINE CLINIC | Facility: CLINIC | Age: 27
End: 2022-11-07

## 2022-11-07 VITALS
TEMPERATURE: 97.6 F | HEIGHT: 66 IN | WEIGHT: 127 LBS | BODY MASS INDEX: 20.41 KG/M2 | DIASTOLIC BLOOD PRESSURE: 70 MMHG | SYSTOLIC BLOOD PRESSURE: 110 MMHG

## 2022-11-07 DIAGNOSIS — Z13.1 DIABETES MELLITUS SCREENING: ICD-10-CM

## 2022-11-07 DIAGNOSIS — Z13.220 LIPID SCREENING: ICD-10-CM

## 2022-11-07 DIAGNOSIS — Z00.00 ANNUAL PHYSICAL EXAM: Primary | ICD-10-CM

## 2022-11-07 DIAGNOSIS — Z11.4 SCREENING FOR HIV WITHOUT PRESENCE OF RISK FACTORS: ICD-10-CM

## 2022-11-07 DIAGNOSIS — D64.9 LOW HEMATOCRIT: ICD-10-CM

## 2022-11-07 DIAGNOSIS — Z11.59 ENCOUNTER FOR HEPATITIS C SCREENING TEST FOR LOW RISK PATIENT: ICD-10-CM

## 2022-11-07 NOTE — PROGRESS NOTES
[facility] MEDICINE [place]    NAME: Lilia Roach  AGE: 32 y o  SEX: female  : 1995     DATE: 2022     Assessment and Plan:     Problem List Items Addressed This Visit        Other    Annual physical exam - Primary    Low hematocrit    Relevant Orders    Iron Panel (Includes Ferritin, Iron Sat%, Iron, and TIBC)    CBC and differential      Other Visit Diagnoses     Screening for HIV without presence of risk factors        Relevant Orders    HIV 1/2 Antigen/Antibody (4th Generation) w Reflex [facility]    Encounter for hepatitis C screening test for low risk patient        Lipid screening        Relevant Orders    Lipid Panel with Direct LDL reflex    Diabetes mellitus screening        Relevant Orders    Hemoglobin A1C          Immunizations and preventive care screenings were discussed with patient today  Appropriate education was printed on patient's after visit summary  Assessment and Plan:  · Right-sided abdominal pain- Patient endorses episodes of right sided abdominal pain associated with bloating and constipation  Denies fever, nausea, vomiting, blood in stool  Recent RUQ ultrasound normal  Recent LFT's normal  Liver/gallbladder pathology less likely  Given bloating and constipation, most likely IBS picture  · Recommended patient take probiotic supplement that contains Lactobacillus  Also can eat greek yogurt  · Follow-up in 3 months  · If GI symptoms do not improve, or worsen GI consult indicated  · Patient is a carrier for Hereditary Hemachromatosis  Check Iron panel and CBC  Return in about 3 months (around 2023) for Recheck abd pain  Chief Complaint:     Chief Complaint   Patient presents with   • Physical Exam      History of Present Illness:     Adult Annual Physical   Patient here for a comprehensive physical exam  The patient reports pain in the upper right quadrant associated with bloating   She first noticed it one year ago when her  gave her a hug  The pain and bloating last roughly 1/2 hour before fading  She recently had a similar episode that lasted about a day  Endorses constipation during these episodes  Denies associated nausea, vomiting  Diet and Physical Activity  · Diet/Nutrition: well balanced diet  · Exercise: walking and hikes  Pilates  Depression Screening  PHQ-2/9 Depression Screening         General Health  · Sleep: gets 7-8 hours of sleep on average  · Hearing: Normal bilaterally  · Vision: She was going yearly until recently switching to [facility] [place] [address]  [facility]  Been ~1 5 yrs  · Dental: regular dental visits  /GYN Health  · Last menstrual period: 11/1/22  · Contraceptive method: Sometimes uses condoms  · History of STDs?: no      Review of Systems:     Review of Systems   Constitutional: Positive for fatigue  Negative for chills and fever  Respiratory: Negative for chest tightness and shortness of breath  Cardiovascular: Negative for chest pain, palpitations and leg swelling  Gastrointestinal: Positive for constipation (Associated with right quadrant pain episodes) and diarrhea (Often has loose stools)  Negative for blood in stool and nausea  Endocrine: Negative for cold intolerance and heat intolerance  Genitourinary: Negative for difficulty urinating, dyspareunia and dysuria  Musculoskeletal: Negative for arthralgias and back pain  Neurological: Positive for headaches  Negative for light-headedness  Psychiatric/Behavioral: Negative for agitation and behavioral problems        Past Medical History:     Past Medical History:   Diagnosis Date   • Fibroid    • Migraine       Past Surgical History:     Past Surgical History:   Procedure Laterality Date   • BREAST BIOPSY     • MYOMECTOMY        Social History:     Social History     Socioeconomic History   • Marital status: /Civil Union     Spouse name: None   • Number of children: None   • Years of education: None   • Highest education level: None   Occupational History   • None   Tobacco Use   • Smoking status: Never Smoker   • Smokeless tobacco: Never Used   Substance and Sexual Activity   • Alcohol use: Yes     Alcohol/week: 1 0 standard drink     Types: 1 Glasses of wine per week   • Drug use: Yes     Types: Marijuana     Comment: I have a medical card   • Sexual activity: Yes     Partners: Male     Birth control/protection: Condom Male, None   Other Topics Concern   • None   Social History Narrative   • None     Social Determinants of Health     Financial Resource Strain: Not on file   Food Insecurity: Not on file   Transportation Needs: Not on file   Physical Activity: Not on file   Stress: Not on file   Social Connections: Not on file   Intimate Partner Violence: Not on file   Housing Stability: Not on file      Family History:     History reviewed  No pertinent family history  Current Medications:     Current Outpatient Medications   Medication Sig Dispense Refill   • cholecalciferol (VITAMIN D3) 250 MCG (59969 UT) capsule Take 1 capsule by mouth every morning     • ciprofloxacin (CIPRO) 500 mg tablet Take 1 tablet (500 mg total) by mouth every 12 (twelve) hours for 7 days 14 tablet 0   • cyanocobalamin (VITAMIN B-12) 100 mcg tablet Take 1 tablet by mouth every morning     • Omega-3 Fatty Acids (Fish Oil Omega-3) 1000 MG CAPS Take 2 g by mouth       No current facility-administered medications for this visit  Allergies: Allergies   Allergen Reactions   • Penicillins Hives, Shortness Of Breath and Swelling      Physical Exam:     /70   Temp 97 6 °F (36 4 °C)   Ht 5' 6" (1 676 m)   Wt 57 6 kg (127 lb)   BMI 20 50 kg/m²     Physical Exam  Constitutional:       General: She is not in acute distress  Appearance: Normal appearance  She is not ill-appearing  HENT:      Head: Normocephalic and atraumatic  Mouth/Throat:      Mouth: Mucous membranes are dry     Cardiovascular: Rate and Rhythm: Normal rate and regular rhythm  Pulses: Normal pulses  Heart sounds: Normal heart sounds  Pulmonary:      Effort: Pulmonary effort is normal       Breath sounds: Normal breath sounds  Abdominal:      General: Bowel sounds are normal  There is no distension  Palpations: Abdomen is soft  Tenderness: There is no abdominal tenderness  There is no right CVA tenderness, left CVA tenderness, guarding or rebound  Musculoskeletal:         General: No swelling or tenderness  Right lower leg: No edema  Left lower leg: No edema  Skin:     General: Skin is warm and dry  Neurological:      General: No focal deficit present  Mental Status: She is alert and oriented to person, place, and time     Psychiatric:         Mood and Affect: Mood normal          Behavior: Behavior normal           Jeffy Millerton   [facility]

## 2022-11-07 NOTE — PATIENT INSTRUCTIONS

## 2022-11-19 ENCOUNTER — APPOINTMENT (OUTPATIENT)
Dept: LAB | Facility: CLINIC | Age: 27
End: 2022-11-19

## 2022-11-19 DIAGNOSIS — Z13.220 LIPID SCREENING: ICD-10-CM

## 2022-11-19 DIAGNOSIS — Z11.4 SCREENING FOR HIV WITHOUT PRESENCE OF RISK FACTORS: ICD-10-CM

## 2022-11-19 DIAGNOSIS — D64.9 LOW HEMATOCRIT: ICD-10-CM

## 2022-11-19 DIAGNOSIS — Z13.1 DIABETES MELLITUS SCREENING: ICD-10-CM

## 2022-11-19 LAB
BASOPHILS # BLD AUTO: 0.05 THOUSANDS/ÂΜL (ref 0–0.1)
BASOPHILS NFR BLD AUTO: 1 % (ref 0–1)
CHOLEST SERPL-MCNC: 156 MG/DL
EOSINOPHIL # BLD AUTO: 0.05 THOUSAND/ÂΜL (ref 0–0.61)
EOSINOPHIL NFR BLD AUTO: 1 % (ref 0–6)
ERYTHROCYTE [DISTWIDTH] IN BLOOD BY AUTOMATED COUNT: 13.1 % (ref 11.6–15.1)
EST. AVERAGE GLUCOSE BLD GHB EST-MCNC: 74 MG/DL
FERRITIN SERPL-MCNC: 128 NG/ML (ref 8–388)
HBA1C MFR BLD: 4.2 %
HCT VFR BLD AUTO: 36 % (ref 34.8–46.1)
HDLC SERPL-MCNC: 74 MG/DL
HGB BLD-MCNC: 12.4 G/DL (ref 11.5–15.4)
IMM GRANULOCYTES # BLD AUTO: 0.01 THOUSAND/UL (ref 0–0.2)
IMM GRANULOCYTES NFR BLD AUTO: 0 % (ref 0–2)
IRON SATN MFR SERPL: 56 % (ref 15–50)
IRON SERPL-MCNC: 161 UG/DL (ref 50–170)
LDLC SERPL CALC-MCNC: 74 MG/DL (ref 0–100)
LYMPHOCYTES # BLD AUTO: 1.63 THOUSANDS/ÂΜL (ref 0.6–4.47)
LYMPHOCYTES NFR BLD AUTO: 32 % (ref 14–44)
MCH RBC QN AUTO: 33.1 PG (ref 26.8–34.3)
MCHC RBC AUTO-ENTMCNC: 34.4 G/DL (ref 31.4–37.4)
MCV RBC AUTO: 96 FL (ref 82–98)
MONOCYTES # BLD AUTO: 0.36 THOUSAND/ÂΜL (ref 0.17–1.22)
MONOCYTES NFR BLD AUTO: 7 % (ref 4–12)
NEUTROPHILS # BLD AUTO: 3.01 THOUSANDS/ÂΜL (ref 1.85–7.62)
NEUTS SEG NFR BLD AUTO: 59 % (ref 43–75)
NRBC BLD AUTO-RTO: 0 /100 WBCS
PLATELET # BLD AUTO: 241 THOUSANDS/UL (ref 149–390)
PMV BLD AUTO: 10 FL (ref 8.9–12.7)
RBC # BLD AUTO: 3.75 MILLION/UL (ref 3.81–5.12)
TIBC SERPL-MCNC: 288 UG/DL (ref 250–450)
TRIGL SERPL-MCNC: 41 MG/DL
WBC # BLD AUTO: 5.11 THOUSAND/UL (ref 4.31–10.16)

## 2022-11-22 LAB — HIV 1+2 AB+HIV1 P24 AG SERPL QL IA: NORMAL

## 2022-12-31 ENCOUNTER — OFFICE VISIT (OUTPATIENT)
Dept: URGENT CARE | Facility: CLINIC | Age: 27
End: 2022-12-31

## 2022-12-31 VITALS
SYSTOLIC BLOOD PRESSURE: 118 MMHG | DIASTOLIC BLOOD PRESSURE: 56 MMHG | RESPIRATION RATE: 16 BRPM | OXYGEN SATURATION: 100 % | HEART RATE: 80 BPM | TEMPERATURE: 99.3 F | WEIGHT: 122 LBS | HEIGHT: 66 IN | BODY MASS INDEX: 19.61 KG/M2

## 2022-12-31 DIAGNOSIS — J06.9 UPPER RESPIRATORY TRACT INFECTION, UNSPECIFIED TYPE: ICD-10-CM

## 2022-12-31 DIAGNOSIS — J02.9 PHARYNGITIS, UNSPECIFIED ETIOLOGY: Primary | ICD-10-CM

## 2022-12-31 LAB — S PYO AG THROAT QL: NEGATIVE

## 2022-12-31 RX ORDER — METHYLPREDNISOLONE 4 MG/1
TABLET ORAL
Qty: 1 EACH | Refills: 0 | Status: SHIPPED | OUTPATIENT
Start: 2022-12-31

## 2022-12-31 NOTE — PATIENT INSTRUCTIONS
Rapid strep test is negative  No antibiotic indicated at this time  Take prednisone as instructed  While on prednisone do not take any ibuprofen or ibuprofen like products  May safely take Tylenol if needed  This appears to be viral illness and no antibiotic is indicated at this time  Strongly encourage getting plenty of rest over the next few days  Increase your hydration  Vaporizer by bedside may also be helpful  If you are having any sore throat or hoarseness,  you may do warm salt water gargles every 1-2 hours while awake, throat lozenges, Tylenol, voice rest, warm tea with honey  If you are having sinus pressure, nasal congestion, runny nose, and / or post nasal drip you may try the following to help ease your symptoms:            *Clearing your sinuses in a nice steamy shower may be helpful, especially first thing after waking  *Nasal saline rinses every 1-2 hours while awake may also help decrease nasal congestion, drainage  *Afrin nasal spray if significant nasal congestion at bedtime may use   (Do not use for over 3 days however )       *Decongestant / expectorant such as Mucinex D 12 hour 1/2 to 1 tablet as needed with full glass of fluids may help decrease pressure and drainage  If you are having difficulty with ear pressure, discomfort:     Decongestant may be helpful  Flonase nasal spray  Warm compresses against ear(s) for comfort  Although bothersome, mucous is not necessarily a bad thing  Production of mucous is the body's way of trying to capture and flush irritants from mucosal surfaces  Yellow or green mucous does not necessarily mean you have a bacterial infection  Mucous will become more discolored over time, especially first thing in the morning, as your body's immune system  floods the mucosal surfaces with white bloods cells to try and help fight  infection  This white blood cell debride can also cause mucous to be discolored  Again, using nasal saline spray frequently may help soothe and keep mucous flowing out versus getting dried, thickened and / or stuck leading to more sinus pain and pressure  If you have a cough, please realize that a cough is not necessarily a bad thing but a way that your body may be trying to keep your airways clear  Phlegm may be more discolored in the morning  Please note that discolored phlegm does not necessarily mean a bacterial infection  The following things may help with your cough:         *Warm tea with honey or a teaspoon of honey periodically throughout day and / or before bed  *You may also use plain Mucinex (an expectorant to help keep mucus thin so you can clear it easier) or Mucinex DM (expectorant / cough suyppressant) to help decrease cough if it is bothering your sleep  An expectorant works best if you take with full glass of fluids  Other night time cough medication options include Delsym, Robitussin DM, NyQuil  *Propping with an extra pillow or two may be helpful  *Keep water by your bedside to sip on as needed  *Cough drops  Most upper respiratory symptoms start to improve after 7-10 days but may take a few weeks to completely resolve  Mucus may be more discolored first thing in the morning  Discolored mucous does not necessarily mean bacterial infection but can be dehydrated mucous or mucous filled with white blood cell debride that have been helping you to fight your illness  Follow up with your PCP office if not improving over next 10 days  If significant weakness, chest pain, shortness of breath proceed to ER for immediate further evaluation  Transmission precautions advised

## 2022-12-31 NOTE — PROGRESS NOTES
[facility]    NAME: Keri Melton is a 32 y o  female  : 1995    MRN: [id number]  DATE: 2022  TIME: 10:31 AM    Assessment and Plan   Pharyngitis, unspecified etiology [J02 9]  1  Pharyngitis, unspecified etiology  POCT rapid strepA    methylPREDNISolone 4 MG tablet therapy pack      2  Upper respiratory tract infection, unspecified type            Patient Instructions   Patient Instructions   Rapid strep test is negative  No antibiotic indicated at this time  Take prednisone as instructed  While on prednisone do not take any ibuprofen or ibuprofen like products  May safely take Tylenol if needed  This appears to be viral illness and no antibiotic is indicated at this time  Strongly encourage getting plenty of rest over the next few days  Increase your hydration  Vaporizer by bedside may also be helpful  If you are having any sore throat or hoarseness,  you may do warm salt water gargles every 1-2 hours while awake, throat lozenges, Tylenol, voice rest, warm tea with honey  If you are having sinus pressure, nasal congestion, runny nose, and / or post nasal drip you may try the following to help ease your symptoms:            *Clearing your sinuses in a nice steamy shower may be helpful, especially first thing after waking  *Nasal saline rinses every 1-2 hours while awake may also help decrease nasal congestion, drainage  *Afrin nasal spray if significant nasal congestion at bedtime may use   (Do not use for over 3 days however )       *Decongestant / expectorant such as Mucinex D 12 hour 1/2 to 1 tablet as needed with full glass of fluids may help decrease pressure and drainage  If you are having difficulty with ear pressure, discomfort:     Decongestant may be helpful  Flonase nasal spray  Warm compresses against ear(s) for comfort  Although bothersome, mucous is not necessarily a bad thing    Production of mucous is the body's way of trying to capture and flush irritants from mucosal surfaces  Yellow or green mucous does not necessarily mean you have a bacterial infection  Mucous will become more discolored over time, especially first thing in the morning, as your body's immune system  floods the mucosal surfaces with white bloods cells to try and help fight  infection  This white blood cell debride can also cause mucous to be discolored  Again, using nasal saline spray frequently may help soothe and keep mucous flowing out versus getting dried, thickened and / or stuck leading to more sinus pain and pressure  If you have a cough, please realize that a cough is not necessarily a bad thing but a way that your body may be trying to keep your airways clear  Phlegm may be more discolored in the morning  Please note that discolored phlegm does not necessarily mean a bacterial infection  The following things may help with your cough:         *Warm tea with honey or a teaspoon of honey periodically throughout day and / or before bed  *You may also use plain Mucinex (an expectorant to help keep mucus thin so you can clear it easier) or Mucinex DM (expectorant / cough suyppressant) to help decrease cough if it is bothering your sleep  An expectorant works best if you take with full glass of fluids  Other night time cough medication options include Delsym, Robitussin DM, NyQuil  *Propping with an extra pillow or two may be helpful  *Keep water by your bedside to sip on as needed  *Cough drops  Most upper respiratory symptoms start to improve after 7-10 days but may take a few weeks to completely resolve  Mucus may be more discolored first thing in the morning  Discolored mucous does not necessarily mean bacterial infection but can be dehydrated mucous or mucous filled with white blood cell debride that have been helping you to fight your illness           Follow up with your PCP office if not improving over next 10 days  If significant weakness, chest pain, shortness of breath proceed to ER for immediate further evaluation  Transmission precautions advised  Chief Complaint     Chief Complaint   Patient presents with   • Sore Throat     Pt reports feelings of a cold earlier this week, but raw sore throat starting on Thursday  Losing voice, ears uncomfortable, slight cough, runny nose, headache  Tried Advil cold and flu but it didn't help with sx  History of Present Illness   Tri Fong presents to the clinic c/o  78-year-old female comes in after starting with some nasal congestion and sore throat on Thursday  Sore throat is not getting any better  She has had a little bit of postnasal drip and cough  No fever or chills  Some fatigue  She was around her 5month-old niece over [place] who developed respiratory illness and was diagnosed with bronchitis down in [place]  No known COVID exposures  Review of Systems   Review of Systems   Constitutional: Positive for activity change, appetite change and fatigue  Negative for chills and fever  HENT: Positive for congestion, postnasal drip, rhinorrhea, sore throat, trouble swallowing and voice change  Negative for ear discharge, ear pain, sinus pressure and sinus pain  Eyes: Negative  Respiratory: Negative for cough, chest tightness, shortness of breath and wheezing  Cardiovascular: Negative  Hematological: Negative          Current Medications     Long-Term Medications   Medication Sig Dispense Refill   • cholecalciferol (VITAMIN D3) 250 MCG (47616 UT) capsule Take 1 capsule by mouth every morning     • cyanocobalamin (VITAMIN B-12) 100 mcg tablet Take 1 tablet by mouth every morning         Current Allergies     Allergies as of 12/31/2022 - Reviewed 12/31/2022   Allergen Reaction Noted   • Penicillins Hives, Shortness Of Breath, and Swelling 03/05/2018          The following portions of the patient's history were reviewed and updated as appropriate: allergies, current medications, past family history, past medical history, past social history, past surgical history and problem list   Past Medical History:   Diagnosis Date   • Fibroid    • Migraine      Past Surgical History:   Procedure Laterality Date   • BREAST BIOPSY     • MYOMECTOMY       History reviewed  No pertinent family history  Objective   /56   Pulse 80   Temp 99 3 °F (37 4 °C) (Tympanic)   Resp 16   Ht 5' 6" (1 676 m)   Wt 55 3 kg (122 lb)   SpO2 100%   BMI 19 69 kg/m²   No LMP recorded  Physical Exam     Physical Exam  Vitals and nursing note reviewed  Constitutional:       General: She is not in acute distress  Appearance: She is well-developed  She is ill-appearing  She is not toxic-appearing or diaphoretic  Comments: Appears mildly ill but in no acute distress  No trismus or conversational dyspnea  HENT:      Head: Normocephalic and atraumatic  Right Ear: Tympanic membrane, ear canal and external ear normal  No drainage, swelling or tenderness  No middle ear effusion  Tympanic membrane is not erythematous  Left Ear: Tympanic membrane, ear canal and external ear normal  No drainage, swelling or tenderness  No middle ear effusion  Tympanic membrane is not erythematous  Nose: Congestion and rhinorrhea present  Mouth/Throat:      Mouth: Mucous membranes are moist  No oral lesions  Pharynx: Uvula midline  Posterior oropharyngeal erythema present  No pharyngeal swelling, oropharyngeal exudate or uvula swelling  Tonsils: No tonsillar exudate or tonsillar abscesses  Comments: Cobblestoning posterior pharynx with patchy redness  Eyes:      General:         Right eye: No discharge  Left eye: No discharge  Conjunctiva/sclera: Conjunctivae normal       Pupils: Pupils are equal, round, and reactive to light  Cardiovascular:      Rate and Rhythm: Normal rate and regular rhythm  Heart sounds: Normal heart sounds  No murmur heard  No friction rub  No gallop  Pulmonary:      Effort: Pulmonary effort is normal  No respiratory distress  Breath sounds: Normal breath sounds  No stridor  No wheezing, rhonchi or rales  Musculoskeletal:      Cervical back: Normal range of motion and neck supple  No rigidity or tenderness  Lymphadenopathy:      Cervical: No cervical adenopathy  Skin:     General: Skin is warm and dry  Coloration: Skin is not jaundiced or pale  Findings: No rash  Neurological:      Mental Status: She is alert and oriented to person, place, and time     Psychiatric:         Mood and Affect: Mood normal          Behavior: Behavior normal

## 2023-02-08 NOTE — PROGRESS NOTES
Family Medicine Follow-Up Office Visit  Jonathan De Jesus 32 y o  female   MRN: [id number] : 1995  ENCOUNTER: 2023 8:25 AM    Assessment and Plan   RUQ pain  Greatly improved, responding well to probiotic use and avoidance of dairy  Acupuncture may also be playing a role  Recommended continuation of her current regimen, and reevaluation at her next visit  Hemochromatosis carrier  Due to Dora Blackburn' hemochromatosis carrier state, as well as her history of phlebotomy providing relief for her headaches and fatigue, I recommended that Dora Blackburn connect with our hematology team   Her lab work is currently only remarkable for an increased iron saturation, but ongoing evaluation and management would be greatly appreciated  Referral placed  We will regroup in 3 months    Chief Complaint     Chief Complaint   Patient presents with   • Follow-up   • Abdominal Pain       History of Present Illness   Jonathan De Jesus is a 32y o -year-old female who presents today for follow up of abdominal pain and labs  Dora Blackburn hasn't had abdominal issues since our last visit - has been having acupuncture sessions and using probiotic as discussed  Also using another "gut lelo" supplement  Feeling less bloated  Has also stopped dairy  Labwork was generally within normal limits, with iron saturation slightly above the normal range  Dora Blackburn is a hemochromatosis carrier  Has consistent headaches and fatigue - previously was giving blood regularly, which helped  Has never seen a Hematologist     Review of Systems   Review of Systems   Constitutional: Positive for fatigue  Negative for activity change, chills and fever  HENT: Negative for congestion, sinus pressure, sinus pain and sore throat  Respiratory: Negative for cough, shortness of breath and wheezing  Cardiovascular: Negative for chest pain, palpitations and leg swelling  Gastrointestinal: Negative for abdominal pain, diarrhea, nausea and vomiting  Genitourinary: Negative for decreased urine volume, dysuria, frequency and urgency  Musculoskeletal: Negative for arthralgias, myalgias, neck pain and neck stiffness  Skin: Negative for rash  Neurological: Positive for headaches  Negative for dizziness, light-headedness and numbness  Active Problem List     Patient Active Problem List   Diagnosis   • PMS (premenstrual syndrome)   • RUQ pain   • Dyspepsia   • Diarrhea   • Annual physical exam   • Low hematocrit   • Hemochromatosis carrier       Past Medical History, Past Surgical History, Family History, and Social History were reviewed and updated today as appropriate  Objective   /70   Ht 5' 6" (1 676 m)   Wt 57 2 kg (126 lb)   BMI 20 34 kg/m²     Physical Exam  Constitutional:       General: She is not in acute distress  Appearance: She is well-developed  HENT:      Head: Normocephalic and atraumatic  Eyes:      Pupils: Pupils are equal, round, and reactive to light  Cardiovascular:      Rate and Rhythm: Normal rate and regular rhythm  Heart sounds: Normal heart sounds  No murmur heard  No friction rub  No gallop  Pulmonary:      Effort: Pulmonary effort is normal  No respiratory distress  Breath sounds: Normal breath sounds  No wheezing or rales  Abdominal:      General: There is no distension  Palpations: Abdomen is soft  Musculoskeletal:         General: Normal range of motion  Cervical back: Normal range of motion and neck supple  Neurological:      Mental Status: She is alert and oriented to person, place, and time  Psychiatric:         Behavior: Behavior normal          Thought Content:  Thought content normal        Diabetic Foot Exam    Pertinent Laboratory/Diagnostic Studies:  No results found for: GLUCOSE, BUN, CREATININE, CALCIUM, NA, K, CO2, CL  Lab Results   Component Value Date    ALT 19 10/15/2022    AST 9 10/15/2022    ALKPHOS 56 10/15/2022       Lab Results   Component Value Date WBC 5 11 11/19/2022    HGB 12 4 11/19/2022    HCT 36 0 11/19/2022    MCV 96 11/19/2022     11/19/2022       No results found for: TSH    No results found for: CHOL  Lab Results   Component Value Date    TRIG 41 11/19/2022     Lab Results   Component Value Date    HDL 74 11/19/2022     Lab Results   Component Value Date    LDLCALC 74 11/19/2022     Lab Results   Component Value Date    HGBA1C 4 2 11/19/2022       Results for orders placed or performed in visit on 12/31/22   POCT rapid strepA   Result Value Ref Range     RAPID STREP A Negative Negative       Orders Placed This Encounter   Procedures   • Ambulatory Referral to Hematology / Oncology         Current Medications     Current Outpatient Medications   Medication Sig Dispense Refill   • cholecalciferol (VITAMIN D3) 250 MCG (87552 UT) capsule Take 1 capsule by mouth every morning     • cyanocobalamin (VITAMIN B-12) 100 mcg tablet Take 1 tablet by mouth every morning     • Omega-3 Fatty Acids (Fish Oil Omega-3) 1000 MG CAPS Take 2 g by mouth       No current facility-administered medications for this visit         ALLERGIES:  Allergies   Allergen Reactions   • Penicillins Hives, Shortness Of Breath and Swelling       Health Maintenance     Health Maintenance   Topic Date Due   • DTaP,Tdap,and Td Vaccines (1 - Tdap) Never done   • COVID-19 Vaccine (3 - Booster for Pfizer series) 10/03/2021   • Influenza Vaccine (1) Never done   • Depression Screening  11/02/2023   • Annual Physical  11/07/2023   • BMI: Adult  02/09/2024   • Cervical Cancer Screening  03/17/2025   • HIV Screening  Completed   • Hepatitis C Screening  Completed   • Pneumococcal Vaccine: Pediatrics (0 to 5 Years) and At-Risk Patients (6 to 59 Years)  Aged Out   • HIB Vaccine  Aged Out   • IPV Vaccine  Aged Out   • Hepatitis A Vaccine  Aged Out   • Meningococcal ACWY Vaccine  Aged Out   • HPV Vaccine  Aged [employer] History   Administered Date(s) Administered   • EAWMM-77 QOCLSG VACCINE 0 3 ML IM 07/18/2021, 08/08/2021         Yaneth Gee MD   [address]  2/9/2023  8:25 AM    Parts of this note were dictated using M*Modal dictation software and may have sounds-like errors due to variation in pronunciation

## 2023-02-09 ENCOUNTER — OFFICE VISIT (OUTPATIENT)
Dept: FAMILY MEDICINE CLINIC | Facility: CLINIC | Age: 28
End: 2023-02-09

## 2023-02-09 VITALS
HEIGHT: 66 IN | BODY MASS INDEX: 20.25 KG/M2 | SYSTOLIC BLOOD PRESSURE: 110 MMHG | DIASTOLIC BLOOD PRESSURE: 70 MMHG | WEIGHT: 126 LBS

## 2023-02-09 DIAGNOSIS — R10.11 RUQ PAIN: Primary | ICD-10-CM

## 2023-02-09 DIAGNOSIS — Z14.8 HEMOCHROMATOSIS CARRIER: ICD-10-CM

## 2023-02-09 NOTE — ASSESSMENT & PLAN NOTE
Greatly improved, responding well to probiotic use and avoidance of dairy  Acupuncture may also be playing a role  Recommended continuation of her current regimen, and reevaluation at her next visit

## 2023-02-09 NOTE — ASSESSMENT & PLAN NOTE
Due to Stefani Rain' hemochromatosis carrier state, as well as her history of phlebotomy providing relief for her headaches and fatigue, I recommended that Stefani Rain connect with our hematology team   Her lab work is currently only remarkable for an increased iron saturation, but ongoing evaluation and management would be greatly appreciated  Referral placed

## 2023-02-16 ENCOUNTER — TELEPHONE (OUTPATIENT)
Dept: HEMATOLOGY ONCOLOGY | Facility: CLINIC | Age: 28
End: 2023-02-16

## 2023-02-16 NOTE — TELEPHONE ENCOUNTER
Patient has a referral on file - Made an attempt to schedule a new patient consultation  A voicemail was left making patient aware of their referral and instructing them to call back at the [facility] phone number in order to schedule their consultation

## 2023-02-20 ENCOUNTER — TELEPHONE (OUTPATIENT)
Dept: HEMATOLOGY ONCOLOGY | Facility: CLINIC | Age: 28
End: 2023-02-20

## 2023-02-20 NOTE — TELEPHONE ENCOUNTER
Patient has a referral on file - Made an attempt to schedule a new patient consultation  A voicemail was left making patient aware of their referral and instructing them to call back at the [facility] phone number in order to schedule their consultation

## 2023-02-21 ENCOUNTER — TELEPHONE (OUTPATIENT)
Dept: HEMATOLOGY ONCOLOGY | Facility: CLINIC | Age: 28
End: 2023-02-21

## 2023-02-21 NOTE — TELEPHONE ENCOUNTER
Patient has a referral on file - Made an attempt to schedule a new patient consultation  A voicemail was left making patient aware of their referral and instructing them to call back at the [facility] phone number in order to schedule their consultation  This is the third attempt to contact patient unsuccessfully  The referral has been closed and a department letter has been mailed to patients address on file

## 2023-02-24 ENCOUNTER — TELEPHONE (OUTPATIENT)
Dept: HEMATOLOGY ONCOLOGY | Facility: CLINIC | Age: 28
End: 2023-02-24

## 2023-02-24 NOTE — TELEPHONE ENCOUNTER
Patient calling in asking whether the office she is scheduled for at [facility] with Zulema Thompson can send a form to her old doctor's office to get her records  I informed the patient that she would have to go in the office and sign a medical release so they will will be able to fax it to her previous doctor in [place] to get her records  Patient voiced understanding

## 2023-03-16 ENCOUNTER — TELEPHONE (OUTPATIENT)
Dept: HEMATOLOGY ONCOLOGY | Facility: CLINIC | Age: 28
End: 2023-03-16

## 2023-03-16 NOTE — TELEPHONE ENCOUNTER
Patient Call Form   Reason for patient call? Patient called to confirm if documents were received from her former pcp  She stated she went to [facility] and they faxed the request for medical records release  Im not showing we received docomentation  She would like to speak with BODØ because her new patient consult is 3/24/23 @ 2pm     Patient's primary oncologist? Akbar Sung   Name of person the patient was calling for? BODØ   Does the patient issue require a call back? Yes to confirm documents received   If call back required, inform patient that the message will be forwarded to the team and someone will get back to them as soon as possible    Did you relay this information to the patient?  Yes- before I could inform her to refax and give hopeline fax [phone] call was disconnected

## 2023-03-20 ENCOUNTER — TELEPHONE (OUTPATIENT)
Dept: HEMATOLOGY ONCOLOGY | Facility: CLINIC | Age: 28
End: 2023-03-20

## 2023-03-20 NOTE — TELEPHONE ENCOUNTER
Patient Call Form   Reason for patient call? Patient calling inquiring about records from her previous PCP that she stated were sent to us  After looking into RightFax, I informed the patient we had not received anything  The patient stated she would call her PCP again for them to be faxed  Patient declined to verify fax number  Patient's primary oncologist? Petra Apgar, PA-C    Name of person the patient was calling for? Does the patient issue require a call back? No   If call back required, inform patient that the message will be forwarded to the team and someone will get back to them as soon as possible    Did you relay this information to the patient?  N/A

## 2023-03-21 ENCOUNTER — TELEPHONE (OUTPATIENT)
Dept: HEMATOLOGY ONCOLOGY | Facility: CLINIC | Age: 28
End: 2023-03-21

## 2023-03-21 NOTE — TELEPHONE ENCOUNTER
Medical Records Request Route to [facility]   Person calling in  Patient    Confirming records received   Records that are being requested Everything    If someone other than patient is calling, are they listed on the communication consent form?  N/A   Provider Bhumika Dorantes PA-C   Fax Number   Person's name (Attention:)   Patient was given our fax to have her records sent over here, patient had our fax as [phone]   Facility call back number N/A   Patient call back number [phone]

## 2023-03-24 ENCOUNTER — CONSULT (OUTPATIENT)
Dept: HEMATOLOGY ONCOLOGY | Facility: CLINIC | Age: 28
End: 2023-03-24

## 2023-03-24 ENCOUNTER — TELEPHONE (OUTPATIENT)
Dept: GYNECOLOGIC ONCOLOGY | Facility: CLINIC | Age: 28
End: 2023-03-24

## 2023-03-24 VITALS
TEMPERATURE: 98 F | RESPIRATION RATE: 16 BRPM | SYSTOLIC BLOOD PRESSURE: 122 MMHG | HEIGHT: 66 IN | BODY MASS INDEX: 20.65 KG/M2 | WEIGHT: 128.5 LBS | DIASTOLIC BLOOD PRESSURE: 80 MMHG

## 2023-03-24 DIAGNOSIS — Z14.8 HEMOCHROMATOSIS CARRIER: Primary | ICD-10-CM

## 2023-03-24 DIAGNOSIS — E83.19 IRON OVERLOAD: ICD-10-CM

## 2023-03-24 NOTE — TELEPHONE ENCOUNTER
While we try to accommodate patient requests, our priority is to schedule treatment according to Doctor's orders and site availability  1  Does the Provider use the intake sheet or checkout note? 2  What would be a preferred day of the week that would work best for your infusion appointment? Fridays, Saturday  3  Do you prefer mornings or afternoons for your appointments? Morning or early afternoon  4  Are there any days or dates that do not work for your schedule, including any upcoming vacations? N/A  5  We are going to try our best to schedule you at the infusion center closest to your home  In the event that we are unable to what would be your next preferred infusion site or sites? [facility]    6  Do you have transportation to take you to all of your appointments? Yes  7   Would you like the infusion center to draw labs from your port? (disregard if patient doesn't have a port or need labs for infusion appointment) No

## 2023-03-24 NOTE — TELEPHONE ENCOUNTER
Left message on answering machine with time and date of infusion, patient aware schedule can be seen on mychart  My teams number was provided to return my call and confirm appts

## 2023-03-24 NOTE — PROGRESS NOTES
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 overload  We did review her labs  Previously she would donate blood and believes that this did help with her fatigue and headache but left her feeling lightheaded and weak  We discussed and will see if 1/2 phlebotomy 250mLs helps with her symptoms  If so, we can perform 1/2 phlebotomy prn symptoms  If she feels better post phlebotomy, recommend CBCD, iron panel assessment when her symptoms of fatigue/HA recur, and we can arrange phlebotomy if counts allow  If no improvement in her symptoms, recommend assessment of iron studies approximately every 2 years which can be performed through her PCP  Continue limiting iron rich foods  We discussed that once she ceases menstruation, may need closer follow-up of her iron levels  HPI:  Toña Lind is a 32 y o  seen for initial consultation 3/24/2023 at the referral of Germain Worley MD regarding hemochromatosis carrier state  Patient was experiencing fatigue and HA in her teen years  As part of work-up she had iron studies drawn at 13 y/o  March 1, 2008 iron 211, TIBC 324, iron saturation 65%, ferritin 81  Hemoglobin 14 1, MCV 95, normal CMP    Hereditary hemochromatosis testing March 4, 2018 identified that she was found to have a single mutation of H63D     Her father was tested and found to be a carrier and her paternal grandfather is at least a carrier  She has 2 brothers who are not carriers    Over the years, she has donated blood  Last blood donation was around 2020 ( pre-COVID)    She found that she would get lightheaded and weak after blood donation  She believes, however that post donation, her headaches and fatigue would improve  She does not drink alcohol  She has no biological children      Her menses are regular  She does have intermittent fatigue and headaches  She eats a healthy diet and does limit her iron intake  Over the past 2 to 3 years she has had approximately 4 episodes of acute right lower quadrant abdominal pain that would last a few hours  She has not been able to associate any food, movement with these discomforts  She denies any dyspepsia  No bowel or bladder issues when this pain occurs  She states on her maternal side of the family, there is a strong history of gallstones  10/15/22 celiac panel showed normal tissue transglutaminase, endomysial antibody, IgA 84(LLN 87)    10/18/2022 right upper quadrant ultrasound- normal   Liver surface is smooth with normal echogenicity and measured 15 7 cm    November 19, 2022 hemoglobin 12 4, MCV 96 iron saturation 56%, ferritin 128        Review of Systems   Constitutional: Negative for appetite change, chills, diaphoresis, fatigue, fever and unexpected weight change  HENT:   Negative for mouth sores, nosebleeds, sore throat, tinnitus and voice change  Eyes: Negative for eye problems  Respiratory: Negative for chest tightness, cough, shortness of breath and wheezing  Cardiovascular: Negative for chest pain, leg swelling and palpitations  Gastrointestinal: Negative for abdominal distention, abdominal pain, blood in stool, constipation, diarrhea, nausea, rectal pain and vomiting  Endocrine: Negative for hot flashes  Genitourinary: Negative  Musculoskeletal: Negative for gait problem and myalgias  Skin: Negative for itching and rash  Neurological: Negative for dizziness, gait problem, headaches, light-headedness and numbness  Hematological: Negative for adenopathy  Psychiatric/Behavioral: Negative for confusion and sleep disturbance  The patient is not nervous/anxious           Test Results:      Labs:   Lab Results   Component Value Date    HGB 12 4 11/19/2022    HCT 36 0 11/19/2022    MCV 96 11/19/2022     11/19/2022    WBC 5 11 11/19/2022    NRBC 0 11/19/2022     Lab Results   Component Value Date    AST 9 10/15/2022    ALT 19 10/15/2022    ALKPHOS 56 10/15/2022           Imaging:   No results found  Active Problems:   Patient Active Problem List   Diagnosis   • PMS (premenstrual syndrome)   • RUQ pain   • Dyspepsia   • Diarrhea   • Annual physical exam   • Low hematocrit   • Hemochromatosis carrier       Past Medical History:   Past Medical History:   Diagnosis Date   • Fibroid    • Migraine        Surgical History:   Past Surgical History:   Procedure Laterality Date   • BREAST BIOPSY     • MYOMECTOMY         Family History:  No family history on file  Cancer-related family history is not on file  Social History:   Social History     Socioeconomic History   • Marital status: /Civil Union     Spouse name: Not on file   • Number of children: Not on file   • Years of education: Not on file   • Highest education level: Not on file   Occupational History   • Not on file   Tobacco Use   • Smoking status: Never   • Smokeless tobacco: Never   Substance and Sexual Activity   • Alcohol use:  Yes     Alcohol/week: 1 0 standard drink     Types: 1 Glasses of wine per week   • Drug use: Yes     Types: Marijuana     Comment: I have a medical card   • Sexual activity: Yes     Partners: Male     Birth control/protection: Condom Male, None   Other Topics Concern   • Not on file   Social History Narrative   • Not on file     Social Determinants of Health     Financial Resource Strain: Not on file   Food Insecurity: Not on file   Transportation Needs: Not on file   Physical Activity: Not on file   Stress: Not on file   Social Connections: Not on file   Intimate Partner Violence: Not on file   Housing Stability: Not on file       Current Medications:   Current Outpatient Medications   Medication Sig Dispense Refill   • cholecalciferol (VITAMIN D3) 250 MCG (01587 UT) capsule Take 1 capsule by mouth every morning • cyanocobalamin (VITAMIN B-12) 100 mcg tablet Take 1 tablet by mouth every morning     • Magnesium Gluconate (MAGNESIUM 27 PO) Take by mouth daily     • Omega-3 Fatty Acids (Fish Oil Omega-3) 1000 MG CAPS Take 2 g by mouth     • Probiotic Product (PROBIOTIC-10 PO) Take by mouth daily       No current facility-administered medications for this visit  Allergies: Allergies   Allergen Reactions   • Penicillins Hives, Shortness Of Breath and Swelling         Physical Exam:    Body surface area is 1 66 meters squared  Ht Readings from Last 3 Encounters:   03/24/23 5' 6" (1 676 m)   02/09/23 5' 6" (1 676 m)   12/31/22 5' 6" (1 676 m)       Wt Readings from Last 3 Encounters:   03/24/23 58 3 kg (128 lb 8 oz)   02/09/23 57 2 kg (126 lb)   12/31/22 55 3 kg (122 lb)        Temp Readings from Last 3 Encounters:   03/24/23 98 °F (36 7 °C) (Temporal)   12/31/22 99 3 °F (37 4 °C) (Tympanic)   11/07/22 97 6 °F (36 4 °C)        BP Readings from Last 3 Encounters:   02/09/23 110/70   12/31/22 118/56   11/07/22 110/70         Pulse Readings from Last 3 Encounters:   12/31/22 80   11/02/22 (!) 111   10/14/22 85          Physical Exam    Physical Exam  Vitals reviewed  Constitutional:       General: She is not in acute distress  Appearance: She is well-developed  She is not diaphoretic  HENT:      Head: Normocephalic and atraumatic  Eyes:      Conjunctiva/sclera: Conjunctivae normal    Neck:      Trachea: No tracheal deviation  Cardiovascular:      Rate and Rhythm: Normal rate and regular rhythm  Heart sounds: No murmur heard  No friction rub  No gallop  Pulmonary:      Effort: Pulmonary effort is normal  No respiratory distress  Breath sounds: Normal breath sounds  No wheezing or rales  Chest:      Chest wall: No tenderness  Abdominal:      General: There is no distension  Palpations: Abdomen is soft  Tenderness: There is no abdominal tenderness     Musculoskeletal:      Cervical back: Normal range of motion and neck supple  Lymphadenopathy:      Cervical: No cervical adenopathy  Skin:     General: Skin is warm and dry  Coloration: Skin is not pale  Findings: No erythema  Neurological:      Mental Status: She is alert and oriented to person, place, and time  Psychiatric:         Behavior: Behavior normal          Thought Content:  Thought content normal          Judgment: Judgment normal              Emergency Contacts:    Extended Emergency Contact Information  Primary Emergency Contact: Hiro Batista  Address: [address] [address], [address] [facility]  Mobile Phone: [phone]  Relation: Spouse

## 2023-03-27 NOTE — TELEPHONE ENCOUNTER
Per patients request, I did leave a detailed message with time and date of new infusion appointment  Patient was provided with [facility] infusion center number incase she needs to reschedule directly with them and was also provided with my teams number  Patient is aware schedule can be seen on mychart

## 2023-03-27 NOTE — TELEPHONE ENCOUNTER
Patient returned my call, please reschedule Phlebotomy on a Friday per patients request  Patient can not do treatment on Wednesday  Thank you!

## 2023-03-27 NOTE — TELEPHONE ENCOUNTER
Patient returned my call and stated she needs her Phlebotomy appointment to be after 21 [phone], per patient 11am-12pm works best  Patient is currently scheduled for 830 on 3/31  Please reschedule for requested time             Thank you

## 2023-03-29 ENCOUNTER — HOSPITAL ENCOUNTER (OUTPATIENT)
Dept: INFUSION CENTER | Facility: HOSPITAL | Age: 28
End: 2023-03-29
Attending: INTERNAL MEDICINE

## 2023-04-28 ENCOUNTER — ANNUAL EXAM (OUTPATIENT)
Dept: OBGYN CLINIC | Facility: CLINIC | Age: 28
End: 2023-04-28

## 2023-04-28 VITALS
DIASTOLIC BLOOD PRESSURE: 78 MMHG | WEIGHT: 127 LBS | SYSTOLIC BLOOD PRESSURE: 110 MMHG | BODY MASS INDEX: 20.41 KG/M2 | HEIGHT: 66 IN

## 2023-04-28 DIAGNOSIS — Z01.419 ENCNTR FOR GYN EXAM (GENERAL) (ROUTINE) W/O ABN FINDINGS: Primary | ICD-10-CM

## 2023-04-28 DIAGNOSIS — Z31.69 ENCOUNTER FOR PRECONCEPTION CONSULTATION: ICD-10-CM

## 2023-04-28 NOTE — PROGRESS NOTES
Amelia Silva  1995      CC:  Yearly exam    S:  32 y o  female here for yearly exam    Cycles are usually pretty regular, sometimes can be off by a week  Heavy x 1 day, moderate to light, for total of 5 days  Headaches and nausea with cycles  PMS we addressed last year overall has been okay  Working on self care/recognition and doing better with this  She denies vaginal discharge, itching, pelvic pain  She has no urinary concerns, does not have incontinence  No bowel concerns  No breast concerns  Sexual activity: She is sexually active without pain, bleeding or dryness  She is  and monogamous  She is not interested in STD screening today  Contraception: She uses condoms sometimes for contraception  Open to pregnancy, planning to try in about a year  Last Pap: 3/17/22 - NILM  She has had gardasil  We reviewed ASCCP guidelines for Pap testing today  Family hx of breast cancer: no  Family hx of ovarian cancer: no  Family hx of colon cancer: no      Current Outpatient Medications:   •  cholecalciferol (VITAMIN D3) 250 MCG (93829 UT) capsule, Take 1 capsule by mouth every morning, Disp: , Rfl:   •  cyanocobalamin (VITAMIN B-12) 100 mcg tablet, Take 1 tablet by mouth every morning, Disp: , Rfl:   •  Magnesium Gluconate (MAGNESIUM 27 PO), Take by mouth daily, Disp: , Rfl:   •  Omega-3 Fatty Acids (Fish Oil Omega-3) 1000 MG CAPS, Take 2 g by mouth, Disp: , Rfl:   •  Probiotic Product (PROBIOTIC-10 PO), Take by mouth daily, Disp: , Rfl:      Patient Active Problem List   Diagnosis   • PMS (premenstrual syndrome)   • RUQ pain   • Dyspepsia   • Diarrhea   • Annual physical exam   • Low hematocrit   • Hemochromatosis carrier   • Iron overload     Past Medical History:   Diagnosis Date   • Fibroid    • Migraine      No family history on file  Review of Systems   Respiratory: Negative  Cardiovascular: Negative  Gastrointestinal: Negative for constipation and diarrhea  "    O:  Blood pressure 110/78, height 5' 5 75\" (1 67 m), weight 57 6 kg (127 lb), last menstrual period 04/13/2023  Patient appears well and is not in distress  Breasts are symmetrical without mass, tenderness, nipple discharge, skin changes or adenopathy  Abdomen is soft and nontender without masses  External genitals are normal without lesions or rashes  Urethral meatus and urethra are normal  Bladder is normal to palpation  Vagina is normal without discharge or bleeding  Cervix is normal without discharge or lesion  Uterus is normal, mobile, nontender without palpable mass  Adnexa are normal, nontender, without palpable mass  A:  Yearly exam      P:   Pap up to date   Contraception continue condoms   Discussed preconception planning =- PNV and timing  Discussed CF and SMA screening - rx provided, she will check on insurance coverage  RTO one year for yearly exam or sooner as needed        "

## 2023-05-08 ENCOUNTER — OFFICE VISIT (OUTPATIENT)
Dept: FAMILY MEDICINE CLINIC | Facility: CLINIC | Age: 28
End: 2023-05-08

## 2023-05-08 VITALS
OXYGEN SATURATION: 99 % | WEIGHT: 127 LBS | TEMPERATURE: 98.5 F | HEART RATE: 80 BPM | BODY MASS INDEX: 20.41 KG/M2 | DIASTOLIC BLOOD PRESSURE: 70 MMHG | HEIGHT: 66 IN | SYSTOLIC BLOOD PRESSURE: 100 MMHG

## 2023-05-08 DIAGNOSIS — R10.11 RUQ PAIN: Primary | ICD-10-CM

## 2023-05-08 DIAGNOSIS — Z14.8 HEMOCHROMATOSIS CARRIER: ICD-10-CM

## 2023-05-08 NOTE — ASSESSMENT & PLAN NOTE
Lupe Branch saw Hematology and had a nice experience  She's planning to potentially go for quarterly phlebotomy, with frequency to be adjusted if symptoms require  Name band;

## 2023-05-08 NOTE — PROGRESS NOTES
Family Medicine Follow-Up Office Visit  Katlyn Keating 32 y o  female   MRN: [id number] : 1995  ENCOUNTER: 2023 8:43 AM    Assessment and Plan   Hemochromatosis carrier  Lea Anaya saw Hematology and had a nice experience  She's planning to potentially go for quarterly phlebotomy, with frequency to be adjusted if symptoms require  RUQ pain  Lea Anaya has noted substantial improvement with use of probiotic and management of her dairy intake  She'll continue with all of these interventions and we'll monitor over time  Health Maintenance  Routine GYN was reassuring recently  No screenings or vaccines due  RTC 6 months for annual physical, or sooner as needed    Chief Complaint     Chief Complaint   Patient presents with   • Follow-up     Discuss hematologist appointment        History of Present Illness   Katlyn Keating is a 32y o -year-old female who presents today for follow-up of abdominal discomfort and being a hemochromatosis carrier  Abdominal issues have been very well-controlled  Using probiotic, which is helping  Avoiding dairy or using lactaid helps quite a bit  Lea Anaya saw Hematology to discuss her carrier status - the recommendation was made to check iron/Hb labs when she experiences her headaches and other symptoms, with phlebotomy if appropriate  Had a phlebotomy tx within the last couple of weeks without clear change in how she feels  Had a recent routine GYN exam (reassuring)  Review of Systems   Review of Systems   Constitutional: Negative for activity change, chills, fatigue and fever  HENT: Negative for congestion, sinus pressure, sinus pain and sore throat  Respiratory: Negative for cough, shortness of breath and wheezing  Cardiovascular: Negative for chest pain, palpitations and leg swelling  Gastrointestinal: Negative for abdominal pain, diarrhea, nausea and vomiting     Genitourinary: Negative for decreased urine volume, dysuria, frequency and "urgency  Musculoskeletal: Negative for arthralgias, myalgias, neck pain and neck stiffness  Skin: Negative for rash  Neurological: Negative for dizziness, light-headedness, numbness and headaches  Active Problem List     Patient Active Problem List   Diagnosis   • PMS (premenstrual syndrome)   • RUQ pain   • Dyspepsia   • Diarrhea   • Annual physical exam   • Low hematocrit   • Hemochromatosis carrier   • Iron overload       Past Medical History, Past Surgical History, Family History, and Social History were reviewed and updated today as appropriate  Objective   /70 (BP Location: Right arm, Patient Position: Sitting, Cuff Size: Standard)   Pulse 80   Temp 98 5 °F (36 9 °C) (Tympanic)   Ht 5' 5 75\" (1 67 m)   Wt 57 6 kg (127 lb)   LMP 04/13/2023   SpO2 99%   BMI 20 65 kg/m²     Physical Exam  Constitutional:       General: She is not in acute distress  Appearance: She is well-developed  HENT:      Head: Normocephalic and atraumatic  Eyes:      Pupils: Pupils are equal, round, and reactive to light  Cardiovascular:      Rate and Rhythm: Normal rate and regular rhythm  Heart sounds: Normal heart sounds  No murmur heard  No friction rub  No gallop  Pulmonary:      Effort: Pulmonary effort is normal  No respiratory distress  Breath sounds: Normal breath sounds  No wheezing or rales  Abdominal:      General: There is no distension  Palpations: Abdomen is soft  Musculoskeletal:         General: Normal range of motion  Cervical back: Normal range of motion and neck supple  Neurological:      Mental Status: She is alert and oriented to person, place, and time  Psychiatric:         Behavior: Behavior normal          Thought Content:  Thought content normal        Diabetic Foot Exam    Pertinent Laboratory/Diagnostic Studies:  No results found for: GLUCOSE, BUN, CREATININE, CALCIUM, NA, K, CO2, CL  Lab Results   Component Value Date    ALT 19 10/15/2022    " AST 9 10/15/2022    ALKPHOS 56 10/15/2022       Lab Results   Component Value Date    WBC 5 11 11/19/2022    HGB 12 4 11/19/2022    HCT 36 0 11/19/2022    MCV 96 11/19/2022     11/19/2022       No results found for: TSH    No results found for: CHOL  Lab Results   Component Value Date    TRIG 41 11/19/2022     Lab Results   Component Value Date    HDL 74 11/19/2022     Lab Results   Component Value Date    LDLCALC 74 11/19/2022     Lab Results   Component Value Date    HGBA1C 4 2 11/19/2022       Results for orders placed or performed in visit on 12/31/22   POCT rapid strepA   Result Value Ref Range     RAPID STREP A Negative Negative       No orders of the defined types were placed in this encounter  Current Medications     Current Outpatient Medications   Medication Sig Dispense Refill   • cholecalciferol (VITAMIN D3) 250 MCG (85123 UT) capsule Take 1 capsule by mouth every morning     • cyanocobalamin (VITAMIN B-12) 100 mcg tablet Take 1 tablet by mouth every morning     • Magnesium Gluconate (MAGNESIUM 27 PO) Take by mouth daily     • Omega-3 Fatty Acids (Fish Oil Omega-3) 1000 MG CAPS Take 2 g by mouth     • Probiotic Product (PROBIOTIC-10 PO) Take by mouth daily       No current facility-administered medications for this visit         ALLERGIES:  Allergies   Allergen Reactions   • Penicillins Hives, Shortness Of Breath and Swelling       Health Maintenance     Health Maintenance   Topic Date Due   • DTaP,Tdap,and Td Vaccines (1 - Tdap) Never done   • COVID-19 Vaccine (3 - Booster for Pfizer series) 10/03/2021   • Depression Screening  11/02/2023   • Influenza Vaccine (Season Ended) 09/01/2023   • Annual Physical  11/07/2023   • BMI: Adult  04/28/2024   • Cervical Cancer Screening  03/17/2025   • HIV Screening  Completed   • Hepatitis C Screening  Completed   • Pneumococcal Vaccine: Pediatrics (0 to 5 Years) and At-Risk Patients (6 to 59 Years)  Aged Out   • HIB Vaccine  Aged Out   • IPV Vaccine Aged Out   • Hepatitis A Vaccine  Aged Out   • Meningococcal ACWY Vaccine  Aged Out   • HPV Vaccine  Aged Out     Immunization History   Administered Date(s) Administered   • COVID-19 PFIZER VACCINE 0 3 ML IM 07/18/2021, 08/08/2021         Eagle Diaz MD   [address]  5/8/2023  8:43 AM    Parts of this note were dictated using [employer] dictation software and may have sounds-like errors due to variation in pronunciation

## 2023-05-08 NOTE — ASSESSMENT & PLAN NOTE
Magui Romo has noted substantial improvement with use of probiotic and management of her dairy intake  She'll continue with all of these interventions and we'll monitor over time

## 2023-05-29 ENCOUNTER — OFFICE VISIT (OUTPATIENT)
Dept: URGENT CARE | Facility: MEDICAL CENTER | Age: 28
End: 2023-05-29

## 2023-05-29 VITALS
DIASTOLIC BLOOD PRESSURE: 74 MMHG | TEMPERATURE: 97.1 F | HEIGHT: 65 IN | BODY MASS INDEX: 20.83 KG/M2 | RESPIRATION RATE: 20 BRPM | WEIGHT: 125 LBS | HEART RATE: 109 BPM | SYSTOLIC BLOOD PRESSURE: 138 MMHG | OXYGEN SATURATION: 98 %

## 2023-05-29 DIAGNOSIS — R30.0 DYSURIA: Primary | ICD-10-CM

## 2023-05-29 LAB
SL AMB  POCT GLUCOSE, UA: ABNORMAL
SL AMB LEUKOCYTE ESTERASE,UA: ABNORMAL
SL AMB POCT BILIRUBIN,UA: ABNORMAL
SL AMB POCT BLOOD,UA: ABNORMAL
SL AMB POCT CLARITY,UA: CLEAR
SL AMB POCT COLOR,UA: ABNORMAL
SL AMB POCT KETONES,UA: ABNORMAL
SL AMB POCT NITRITE,UA: ABNORMAL
SL AMB POCT PH,UA: ABNORMAL
SL AMB POCT SPECIFIC GRAVITY,UA: ABNORMAL
SL AMB POCT URINE HCG: NORMAL
SL AMB POCT URINE PROTEIN: ABNORMAL
SL AMB POCT UROBILINOGEN: ABNORMAL

## 2023-05-29 RX ORDER — SULFAMETHOXAZOLE AND TRIMETHOPRIM 800; 160 MG/1; MG/1
1 TABLET ORAL EVERY 12 HOURS SCHEDULED
Qty: 10 TABLET | Refills: 0 | Status: SHIPPED | OUTPATIENT
Start: 2023-05-29 | End: 2023-06-03

## 2023-05-29 NOTE — PROGRESS NOTES
[facility]        NAME: Estrella Escobar is a 32 y o  female  : 1995    MRN: [id number]  DATE: May 29, 2023  TIME: 8:44 AM    Assessment and Plan   Dysuria [R30 0]  1  Dysuria  POCT urine dip    Urine culture    POCT urine HCG    sulfamethoxazole-trimethoprim (BACTRIM DS) 800-160 mg per tablet            Patient Instructions       Follow up with PCP in 3-5 days  Proceed to  ER if symptoms worsen  Chief Complaint     Chief Complaint   Patient presents with   • Possible UTI     Patient started yesterday with bloating burning and pain with urination         History of Present Illness       Has been taking Azo  Her last UTI was several months ago  Urinary Tract Infection   This is a new problem  The current episode started in the past 7 days  The problem occurs every urination  The problem has been gradually worsening  The quality of the pain is described as burning  The pain is moderate  She is sexually active  There is no history of pyelonephritis  Associated symptoms include frequency, hesitancy and urgency  Pertinent negatives include no chills, discharge, flank pain, hematuria, nausea, possible pregnancy, sweats or vomiting  The treatment provided mild relief  There is no history of catheterization, kidney stones, recurrent UTIs, a single kidney, urinary stasis or a urological procedure  Review of Systems   Review of Systems   Constitutional: Negative for chills  Gastrointestinal: Negative for nausea and vomiting  Genitourinary: Positive for frequency, hesitancy and urgency  Negative for flank pain and hematuria  All other systems reviewed and are negative          Current Medications       Current Outpatient Medications:   •  sulfamethoxazole-trimethoprim (BACTRIM DS) 800-160 mg per tablet, Take 1 tablet by mouth every 12 (twelve) hours for 5 days, Disp: 10 tablet, Rfl: 0  •  cholecalciferol (VITAMIN D3) 250 MCG (47242 UT) capsule, Take 1 capsule by mouth every morning, Disp: , "Rfl:   •  cyanocobalamin (VITAMIN B-12) 100 mcg tablet, Take 1 tablet by mouth every morning, Disp: , Rfl:   •  Magnesium Gluconate (MAGNESIUM 27 PO), Take by mouth daily, Disp: , Rfl:   •  Omega-3 Fatty Acids (Fish Oil Omega-3) 1000 MG CAPS, Take 2 g by mouth, Disp: , Rfl:   •  Probiotic Product (PROBIOTIC-10 PO), Take by mouth daily, Disp: , Rfl:     Current Allergies     Allergies as of 05/29/2023 - Reviewed 05/29/2023   Allergen Reaction Noted   • Penicillins Hives, Shortness Of Breath, and Swelling 03/05/2018            The following portions of the patient's history were reviewed and updated as appropriate: allergies, current medications, past family history, past medical history, past social history, past surgical history and problem list      Past Medical History:   Diagnosis Date   • Fibroid    • Migraine        Past Surgical History:   Procedure Laterality Date   • BREAST BIOPSY     • MYOMECTOMY         History reviewed  No pertinent family history  Medications have been verified  Objective   /74   Pulse (!) 109   Temp (!) 97 1 °F (36 2 °C)   Resp 20   Ht 5' 5\" (1 651 m)   Wt 56 7 kg (125 lb)   LMP 05/08/2023   SpO2 98%   BMI 20 80 kg/m²   Patient's last menstrual period was 05/08/2023  Physical Exam     Physical Exam  Constitutional:       Appearance: Normal appearance  She is normal weight  HENT:      Mouth/Throat:      Mouth: Mucous membranes are moist    Cardiovascular:      Rate and Rhythm: Regular rhythm  Tachycardia present  Pulses: Normal pulses  Heart sounds: Normal heart sounds  Pulmonary:      Effort: Pulmonary effort is normal       Breath sounds: Normal breath sounds  Abdominal:      General: Bowel sounds are normal       Palpations: Abdomen is soft  Tenderness: There is abdominal tenderness ( Suprapubic)  There is no right CVA tenderness or left CVA tenderness  Neurological:      Mental Status: She is alert     Psychiatric:         Mood " and Affect: Mood normal          Behavior: Behavior normal

## 2023-05-31 LAB — BACTERIA UR CULT: ABNORMAL

## 2023-06-01 ENCOUNTER — TELEPHONE (OUTPATIENT)
Dept: URGENT CARE | Facility: MEDICAL CENTER | Age: 28
End: 2023-06-01

## 2023-06-01 DIAGNOSIS — N39.0 URINARY TRACT INFECTION WITHOUT HEMATURIA, SITE UNSPECIFIED: Primary | ICD-10-CM

## 2023-06-01 RX ORDER — CIPROFLOXACIN 250 MG/1
250 TABLET, FILM COATED ORAL EVERY 12 HOURS SCHEDULED
Qty: 14 TABLET | Refills: 0 | Status: SHIPPED | OUTPATIENT
Start: 2023-06-01 | End: 2023-06-08

## 2023-06-01 NOTE — TELEPHONE ENCOUNTER
Spoke with the patient regarding urine culture result  She is aware of results  She still has some symptoms in spite of starting and currently on day 2 of Bactrim  I explained to her that the urine culture shows E  coli infection that is resistant to Bactrim  I explained to her I would need to switch her to ciprofloxacin twice a day for 7 days  We will call in a prescription to her local pharmacy  I explained to her that if her symptoms persist after completing a 7-day course, she should have her urine retested  She expressed understanding

## 2024-04-24 ENCOUNTER — TELEPHONE (OUTPATIENT)
Age: 29
End: 2024-04-24

## 2024-04-24 NOTE — TELEPHONE ENCOUNTER
Patient is unhappy that her appointment was canceled and there is nothing for 2-3 months please reach out to patient.

## 2024-04-24 NOTE — TELEPHONE ENCOUNTER
Called pt to reschedule appointment, left msg. No communication consent on file, but will follow up in a few days if not heard back from.

## 2024-04-26 NOTE — TELEPHONE ENCOUNTER
Returned patient's call and left message. Possible opening on Monday May 6 at 3:30pm with Dr. Barr, but informed patient it was possible, not promised. Requested patient to call office number to reschedule when convenient.